# Patient Record
Sex: MALE | Race: OTHER | Employment: OTHER | URBAN - METROPOLITAN AREA
[De-identification: names, ages, dates, MRNs, and addresses within clinical notes are randomized per-mention and may not be internally consistent; named-entity substitution may affect disease eponyms.]

---

## 2023-05-23 ENCOUNTER — OFFICE VISIT (OUTPATIENT)
Dept: PRIMARY CARE | Facility: CLINIC | Age: 73
End: 2023-05-23
Payer: MEDICARE

## 2023-05-23 VITALS
WEIGHT: 207 LBS | HEIGHT: 72 IN | OXYGEN SATURATION: 98 % | DIASTOLIC BLOOD PRESSURE: 80 MMHG | SYSTOLIC BLOOD PRESSURE: 110 MMHG | BODY MASS INDEX: 28.04 KG/M2 | HEART RATE: 85 BPM

## 2023-05-23 DIAGNOSIS — E78.5 ELEVATED LIPIDS: ICD-10-CM

## 2023-05-23 DIAGNOSIS — Z00.00 MEDICARE ANNUAL WELLNESS VISIT, SUBSEQUENT: Primary | ICD-10-CM

## 2023-05-23 DIAGNOSIS — C61 PROSTATE CANCER (MULTI): ICD-10-CM

## 2023-05-23 DIAGNOSIS — R79.89 ABNORMAL TSH: ICD-10-CM

## 2023-05-23 DIAGNOSIS — I10 PRIMARY HYPERTENSION: ICD-10-CM

## 2023-05-23 DIAGNOSIS — Z00.00 ROUTINE GENERAL MEDICAL EXAMINATION AT HEALTH CARE FACILITY: ICD-10-CM

## 2023-05-23 DIAGNOSIS — R94.6 ABNORMAL THYROID FUNCTION TEST: ICD-10-CM

## 2023-05-23 DIAGNOSIS — E78.9 BORDERLINE HIGH CHOLESTEROL: ICD-10-CM

## 2023-05-23 DIAGNOSIS — E55.9 VITAMIN D DEFICIENCY: ICD-10-CM

## 2023-05-23 DIAGNOSIS — R73.03 PRE-DIABETES: ICD-10-CM

## 2023-05-23 PROBLEM — N20.0 NEPHROLITHIASIS: Status: ACTIVE | Noted: 2023-05-23

## 2023-05-23 PROBLEM — M17.0 OSTEOARTHRITIS OF KNEES, BILATERAL: Status: ACTIVE | Noted: 2023-05-23

## 2023-05-23 PROBLEM — Z96.659 S/P TKR (TOTAL KNEE REPLACEMENT): Status: ACTIVE | Noted: 2022-09-14

## 2023-05-23 PROBLEM — Z87.891 FORMER SMOKER, STOPPED SMOKING IN DISTANT PAST: Status: ACTIVE | Noted: 2022-09-14

## 2023-05-23 PROBLEM — Z86.010 HISTORY OF COLONIC POLYPS: Status: ACTIVE | Noted: 2021-11-13

## 2023-05-23 PROBLEM — R97.20 ELEVATED PSA: Status: ACTIVE | Noted: 2021-11-13

## 2023-05-23 PROBLEM — N40.0 BPH WITHOUT OBSTRUCTION/LOWER URINARY TRACT SYMPTOMS: Status: ACTIVE | Noted: 2023-05-23

## 2023-05-23 PROBLEM — F40.240 CLAUSTROPHOBIA: Status: ACTIVE | Noted: 2023-05-23

## 2023-05-23 PROBLEM — Z86.0100 HISTORY OF COLONIC POLYPS: Status: ACTIVE | Noted: 2021-11-13

## 2023-05-23 PROBLEM — M17.12 ARTHRITIS OF LEFT KNEE: Status: ACTIVE | Noted: 2023-05-23

## 2023-05-23 PROBLEM — M17.11 ARTHRITIS OF RIGHT KNEE: Status: ACTIVE | Noted: 2023-05-23

## 2023-05-23 PROBLEM — J30.2 SEASONAL ALLERGIES: Status: ACTIVE | Noted: 2021-11-13

## 2023-05-23 PROCEDURE — 1160F RVW MEDS BY RX/DR IN RCRD: CPT | Performed by: FAMILY MEDICINE

## 2023-05-23 PROCEDURE — G0439 PPPS, SUBSEQ VISIT: HCPCS | Performed by: FAMILY MEDICINE

## 2023-05-23 PROCEDURE — 1036F TOBACCO NON-USER: CPT | Performed by: FAMILY MEDICINE

## 2023-05-23 PROCEDURE — 1159F MED LIST DOCD IN RCRD: CPT | Performed by: FAMILY MEDICINE

## 2023-05-23 PROCEDURE — 1170F FXNL STATUS ASSESSED: CPT | Performed by: FAMILY MEDICINE

## 2023-05-23 PROCEDURE — 3079F DIAST BP 80-89 MM HG: CPT | Performed by: FAMILY MEDICINE

## 2023-05-23 PROCEDURE — 1126F AMNT PAIN NOTED NONE PRSNT: CPT | Performed by: FAMILY MEDICINE

## 2023-05-23 PROCEDURE — 3074F SYST BP LT 130 MM HG: CPT | Performed by: FAMILY MEDICINE

## 2023-05-23 RX ORDER — CELECOXIB 200 MG/1
200 CAPSULE ORAL
COMMUNITY
Start: 2015-09-22 | End: 2024-02-19

## 2023-05-23 RX ORDER — HYDRALAZINE HYDROCHLORIDE 50 MG/1
50 TABLET, FILM COATED ORAL 2 TIMES DAILY
COMMUNITY
Start: 2009-10-22 | End: 2023-11-20 | Stop reason: SDUPTHER

## 2023-05-23 RX ORDER — CLONIDINE 0.3 MG/24H
0.3 PATCH, EXTENDED RELEASE TRANSDERMAL
COMMUNITY
Start: 2009-12-22 | End: 2023-11-20 | Stop reason: SDUPTHER

## 2023-05-23 RX ORDER — TERAZOSIN 2 MG/1
1 CAPSULE ORAL NIGHTLY
COMMUNITY
Start: 2013-01-25 | End: 2023-11-20 | Stop reason: SDUPTHER

## 2023-05-23 RX ORDER — ASCORBIC ACID 125 MG
5000 TABLET,CHEWABLE ORAL EVERY MORNING
COMMUNITY

## 2023-05-23 RX ORDER — SAW PALMETTO 160 MG
160 CAPSULE ORAL DAILY
COMMUNITY
Start: 2009-12-22 | End: 2023-11-20 | Stop reason: SDUPTHER

## 2023-05-23 RX ORDER — TRIAMTERENE AND HYDROCHLOROTHIAZIDE 75; 50 MG/1; MG/1
1 TABLET ORAL
COMMUNITY
Start: 2012-12-14 | End: 2023-11-20 | Stop reason: SDUPTHER

## 2023-05-23 ASSESSMENT — PATIENT HEALTH QUESTIONNAIRE - PHQ9
7. TROUBLE CONCENTRATING ON THINGS, SUCH AS READING THE NEWSPAPER OR WATCHING TELEVISION: NOT AT ALL
5. POOR APPETITE OR OVEREATING: NOT AT ALL
10. IF YOU CHECKED OFF ANY PROBLEMS, HOW DIFFICULT HAVE THESE PROBLEMS MADE IT FOR YOU TO DO YOUR WORK, TAKE CARE OF THINGS AT HOME, OR GET ALONG WITH OTHER PEOPLE: NOT DIFFICULT AT ALL
SUM OF ALL RESPONSES TO PHQ9 QUESTIONS 1 AND 2: 0
3. TROUBLE FALLING OR STAYING ASLEEP OR SLEEPING TOO MUCH: NOT AT ALL
4. FEELING TIRED OR HAVING LITTLE ENERGY: NOT AT ALL
8. MOVING OR SPEAKING SO SLOWLY THAT OTHER PEOPLE COULD HAVE NOTICED. OR THE OPPOSITE, BEING SO FIGETY OR RESTLESS THAT YOU HAVE BEEN MOVING AROUND A LOT MORE THAN USUAL: NOT AT ALL
9. THOUGHTS THAT YOU WOULD BE BETTER OFF DEAD, OR OF HURTING YOURSELF: NOT AT ALL
6. FEELING BAD ABOUT YOURSELF - OR THAT YOU ARE A FAILURE OR HAVE LET YOURSELF OR YOUR FAMILY DOWN: NOT AT ALL
SUM OF ALL RESPONSES TO PHQ QUESTIONS 1-9: 0
2. FEELING DOWN, DEPRESSED OR HOPELESS: NOT AT ALL
1. LITTLE INTEREST OR PLEASURE IN DOING THINGS: NOT AT ALL

## 2023-05-23 ASSESSMENT — ENCOUNTER SYMPTOMS
OCCASIONAL FEELINGS OF UNSTEADINESS: 0
LOSS OF SENSATION IN FEET: 0
DEPRESSION: 0

## 2023-05-23 ASSESSMENT — ACTIVITIES OF DAILY LIVING (ADL)
TAKING_MEDICATION: INDEPENDENT
GROCERY_SHOPPING: INDEPENDENT
MANAGING_FINANCES: INDEPENDENT
BATHING: INDEPENDENT
DRESSING: INDEPENDENT
DOING_HOUSEWORK: INDEPENDENT

## 2023-05-23 NOTE — PROGRESS NOTES
Subjective   Reason for Visit: Anthony Henry is an 72 y.o. male here for a Medicare Wellness visit.     Past Medical, Surgical, and Family History reviewed and updated in chart.    Reviewed all medications by prescribing practitioner or clinical pharmacist (such as prescriptions, OTCs, herbal therapies and supplements) and documented in the medical record.    HPI    Patient Care Team:  Lindsay Bone DO as PCP - General (Family Medicine)     SOC Hx:  retired  36 years  Tobacco Use: Medium Risk (5/23/2023)    Patient History     Smoking Tobacco Use: Former     Smokeless Tobacco Use: Never     Passive Exposure: Not on file     Alcohol Use: Not on file       DIET: general    EXERCISE: Gym/health club routine includes tennis severa time per week - usually doubles.    ELIMINATION: no constipation or diarrhea    No urinary issuesurinary frequency  Followed by urology for stage 1 Prostate    SLEEP: no issues minimally disturbed    MOODS:   PHQ-9: Patient Health Questionnaire-9 Score: 0      POA & Living Will: Wife - Treva Cruz    Review of Systems    Objective   Vitals:  /80 (BP Location: Left arm, Patient Position: Standing, BP Cuff Size: Adult)   Pulse 85   Ht 1.829 m (6')   Wt 93.9 kg (207 lb)   SpO2 98%   BMI 28.07 kg/m²       Physical Exam  Constitutional:       General: He is not in acute distress.     Appearance: Normal appearance. He is not ill-appearing.   HENT:      Head: Normocephalic and atraumatic.   Neck:      Vascular: No carotid bruit.   Cardiovascular:      Rate and Rhythm: Normal rate and regular rhythm.      Pulses: Normal pulses.      Heart sounds: Normal heart sounds. No murmur heard.     No gallop.   Pulmonary:      Effort: Pulmonary effort is normal.      Breath sounds: Normal breath sounds. No wheezing, rhonchi or rales.   Musculoskeletal:      Cervical back: Normal range of motion and neck supple. No rigidity or tenderness.   Lymphadenopathy:      Cervical: No  cervical adenopathy.   Skin:     General: Skin is warm and dry.   Neurological:      Mental Status: He is alert.   Psychiatric:         Mood and Affect: Mood normal.         Behavior: Behavior normal.         Assessment/Plan   Problem List Items Addressed This Visit          Circulatory    Primary hypertension    Relevant Orders    CBC    Comprehensive Metabolic Panel       Endocrine/Metabolic    Pre-diabetes    Relevant Orders    Lipid Panel    Hemoglobin A1C       Other    Elevated lipids    Relevant Orders    Lipid Panel    Medicare annual wellness visit, subsequent - Primary    Borderline high cholesterol    Relevant Orders    Prostate Specific Antigen, Screen    Abnormal TSH    Relevant Orders    TSH with reflex to Free T4 if abnormal    Abnormal thyroid function test    Relevant Orders    TSH with reflex to Free T4 if abnormal     Other Visit Diagnoses       Routine general medical examination at health care facility        Vitamin D deficiency        Relevant Orders    Vitamin D, Total    Prostate cancer (CMS/HCC)        Relevant Orders    Prostate Specific Antigen, Screen        Follow up 6 months for CPE and fasting labs - call with any problems or concerns prior.

## 2023-08-31 LAB — PROSTATE SPECIFIC AG (NG/ML) IN SER/PLAS: 6.6 NG/ML (ref 0–4)

## 2023-11-20 ENCOUNTER — OFFICE VISIT (OUTPATIENT)
Dept: PRIMARY CARE | Facility: CLINIC | Age: 73
End: 2023-11-20
Payer: MEDICARE

## 2023-11-20 VITALS
WEIGHT: 209 LBS | OXYGEN SATURATION: 98 % | SYSTOLIC BLOOD PRESSURE: 124 MMHG | HEIGHT: 72 IN | HEART RATE: 85 BPM | BODY MASS INDEX: 28.31 KG/M2 | DIASTOLIC BLOOD PRESSURE: 80 MMHG

## 2023-11-20 DIAGNOSIS — R79.89 ABNORMAL TSH: ICD-10-CM

## 2023-11-20 DIAGNOSIS — N40.0 BPH WITHOUT OBSTRUCTION/LOWER URINARY TRACT SYMPTOMS: ICD-10-CM

## 2023-11-20 DIAGNOSIS — R97.20 ELEVATED PSA: ICD-10-CM

## 2023-11-20 DIAGNOSIS — I10 PRIMARY HYPERTENSION: ICD-10-CM

## 2023-11-20 DIAGNOSIS — E78.9 BORDERLINE HIGH CHOLESTEROL: ICD-10-CM

## 2023-11-20 DIAGNOSIS — Z00.00 ANNUAL PHYSICAL EXAM: Primary | ICD-10-CM

## 2023-11-20 PROCEDURE — 1036F TOBACCO NON-USER: CPT | Performed by: FAMILY MEDICINE

## 2023-11-20 PROCEDURE — 1126F AMNT PAIN NOTED NONE PRSNT: CPT | Performed by: FAMILY MEDICINE

## 2023-11-20 PROCEDURE — 99397 PER PM REEVAL EST PAT 65+ YR: CPT | Performed by: FAMILY MEDICINE

## 2023-11-20 PROCEDURE — 1159F MED LIST DOCD IN RCRD: CPT | Performed by: FAMILY MEDICINE

## 2023-11-20 PROCEDURE — 1160F RVW MEDS BY RX/DR IN RCRD: CPT | Performed by: FAMILY MEDICINE

## 2023-11-20 PROCEDURE — 3074F SYST BP LT 130 MM HG: CPT | Performed by: FAMILY MEDICINE

## 2023-11-20 PROCEDURE — 3079F DIAST BP 80-89 MM HG: CPT | Performed by: FAMILY MEDICINE

## 2023-11-20 RX ORDER — TRIAMTERENE AND HYDROCHLOROTHIAZIDE 75; 50 MG/1; MG/1
1 TABLET ORAL
Qty: 90 TABLET | Refills: 1 | Status: SHIPPED | OUTPATIENT
Start: 2023-11-20 | End: 2024-04-30 | Stop reason: SDUPTHER

## 2023-11-20 RX ORDER — TERAZOSIN 2 MG/1
2 CAPSULE ORAL NIGHTLY
Qty: 90 CAPSULE | Refills: 1 | Status: SHIPPED | OUTPATIENT
Start: 2023-11-20 | End: 2024-05-21 | Stop reason: SDUPTHER

## 2023-11-20 RX ORDER — SAW PALMETTO 160 MG
160 CAPSULE ORAL DAILY
Qty: 90 CAPSULE | Refills: 1 | Status: SHIPPED | OUTPATIENT
Start: 2023-11-20 | End: 2024-05-21 | Stop reason: ALTCHOICE

## 2023-11-20 RX ORDER — HYDRALAZINE HYDROCHLORIDE 50 MG/1
50 TABLET, FILM COATED ORAL 2 TIMES DAILY
Qty: 180 TABLET | Refills: 1 | Status: SHIPPED | OUTPATIENT
Start: 2023-11-20 | End: 2024-05-21 | Stop reason: SDUPTHER

## 2023-11-20 RX ORDER — CLONIDINE 0.3 MG/24H
1 PATCH, EXTENDED RELEASE TRANSDERMAL
Qty: 12 PATCH | Refills: 1 | Status: ON HOLD | OUTPATIENT
Start: 2023-11-20 | End: 2024-02-26 | Stop reason: SDUPTHER

## 2023-11-20 NOTE — ASSESSMENT & PLAN NOTE
BP controlled today.  Refilling medications at current dosages.  Follow up at least every 6 months.

## 2023-11-20 NOTE — PROGRESS NOTES
"Randy Henry \"Romario" is a 73 y.o. male and is here for a comprehensive physical exam. The patient reports  the following concerns .    LEFT ANKLE SWELLING: last month or so seems to be a little more common  S/p bilateral knee replacements, but nothing recent  No SOB or chest pain associated with this edema    Do you take any herbs or supplements that were not prescribed by a doctor? not asked  Are you taking calcium supplements? no  Are you taking aspirin daily? no    SOC Hx:   Tobacco Use: Medium Risk (11/20/2023)    Patient History     Smoking Tobacco Use: Former     Smokeless Tobacco Use: Never     Passive Exposure: Not on file     Alcohol Use: Not on file       DIET: general    EXERCISE: Exercise is limited by orthopedic condition(s): knee osteoarthritis.    ELIMINATION: no constipation or diarrhea    No urinary issues followed by  Dr. Alba  PSA   Date Value Ref Range Status   08/31/2023 6.60 (H) 0.00 - 4.00 ng/mL Final     Comment:     The FDA requires that the method used for PSA assay be   reported to the physician. Values obtained with different   assay methods must not be used interchangeably. This test   was performed at [facility] using the Siemens  SNAPP'llica PSA method, which is a sandwich immunoassay using   chemiluminescence for quantitation. The assay is approved  for measurement of prostate-specific antigen (PSA) in   serum and may be used in conjunction with a digital rectal  examination in men 50 years and older as an aid in   detection of prostate cancer.   5-Alpha-reductase inhibitors (e.g. Proscar, Finasteride,   Avodart, Dutasteride and Velma) for the treatment of BPH   have been shown to lower PSA levels by an average of 50%   after 6 months of treatment.     09/14/2020 4.4 (H) 0.0 - 4.0 ng/mL Final     Comment:     INTERPRETIVE INFORMATION: Prostate Specific Antigen  The Roche PSA electrochemiluminescent immunoassay was used.   Results obtained with " different test methods or kits cannot be   used interchangeably. The Roche PSA method is approved for use as   an aid in the detection of prostate cancer when used in   conjunction with a digital rectal exam in men age 50 and older.   The Roche PSA method is also indicated for the serial measurement   of PSA to aid in the prognosis and management of prostate cancer   patients. Elevated PSA concentrations can only suggest the   presence of prostate cancer until biopsy is performed. PSA   concentrations can also be elevated in benign prostatic   hyperplasia or inflammatory conditions of the prostate. PSA is   generally not elevated in healthy men or men with non-prostatic   carcinoma.       PSA, Free   Date Value Ref Range Status   09/14/2020 0.4 ng/mL Final     PSA, Free Pct   Date Value Ref Range Status   09/14/2020 9 % Final     Comment:     INTERPRETIVE INFORMATION: Prostate Specific Antigen, Free   Percentage  [facility] uses the Roche Free PSA electrochemiluminescent immunoassay   method in conjunction with the Roche PSA electrochemiluminescent   immunoassay method to determine the free PSA percentage. Values   obtained with different assay methods should not be used   interchangeably. The free PSA percentage is an aid in   distinguishing prostate cancer from benign prostatic conditions in   men age 50 and older with a total PSA between 3 and 10 ng/mL and   negative digital rectal examination findings. Prostatic biopsy is   required for the diagnosis of cancer.  In patients with total PSA concentrations of 4-10 ng/mL, the   probability of finding prostate cancer on needle biopsy by age in   years is:  %fPSA               50-59    60-69    70 or older  0  - 10%            49%      58%      65%  11 - 18%            27%      34%      41%  19 - 25%            18%      24%      30%  Greater than 25%     9%      12%      16%  Other factors may help determine the actual risk of prostate   cancer in individual  patients.  Performed By: [employer]  [address] [place] [address]  : Abigail Pichardo MD         SLEEP: no issues minimally disturbed  Waking frequently to void   Tried Melatonin which seemed to help   Tried Z-quel makes  him drowsy the next     MOODS: stress manageable, no concerning symptoms             Health Maintenance Due   Topic Date Due    Hepatitis C Screening  Never done    Abdominal Aortic Aneurysm (AAA) screening  Never done        Objective   /80 (BP Location: Left arm, Patient Position: Sitting, BP Cuff Size: Adult)   Pulse 85   Ht 1.829 m (6')   Wt 94.8 kg (209 lb)   SpO2 98%   BMI 28.35 kg/m²    Physical Exam  Constitutional:       General: He is not in acute distress.     Appearance: Normal appearance. He is not ill-appearing.   HENT:      Head: Normocephalic and atraumatic.   Neck:      Vascular: No carotid bruit.   Cardiovascular:      Rate and Rhythm: Normal rate and regular rhythm.      Pulses: Normal pulses.      Heart sounds: Normal heart sounds. No murmur heard.     No gallop.   Pulmonary:      Effort: Pulmonary effort is normal.      Breath sounds: Normal breath sounds. No wheezing, rhonchi or rales.   Musculoskeletal:      Cervical back: Normal range of motion and neck supple. No rigidity or tenderness.   Lymphadenopathy:      Cervical: No cervical adenopathy.   Skin:     General: Skin is warm and dry.   Neurological:      Mental Status: He is alert.   Psychiatric:         Mood and Affect: Mood normal.         Behavior: Behavior normal.         Assessment/Plan   Healthy male exam.      1. Orders for fasting labs have been placed in your chart.    Please  have labs drawn at their convenience after 12 hour fast - ok to have water, black coffee or black tea prior to lab draw - water encouraged to ensure adequate hydration.    Please note: EPIC releases your results to your My Chart immediately but this does not mean it has been reviewed by your  provider.  Someone from our office will contact you once results have been reviewed.     2. Discussed the patient's BMI with him.  The BMI is above average. The patient received encouragement for well rounded diet because they have an above normal BMI.    Problem List Items Addressed This Visit       BPH without obstruction/lower urinary tract symptoms    Relevant Medications    saw palmetto 160 mg capsule    terazosin (Hytrin) 2 mg capsule    Elevated PSA     Follow up with urology as scheduled.         Primary hypertension     BP controlled today.  Refilling medications at current dosages.  Follow up at least every 6 months.         Relevant Medications    cloNIDine (Catapres-TTS) 0.3 mg/24 hr patch    hydrALAZINE (Apresoline) 50 mg tablet    triamterene-hydrochlorothiazid (Maxzide) 75-50 mg tablet    Borderline high cholesterol     Review of last fasting labs from previous PCP with good cholesterol levels.         Abnormal TSH     Review of last TSH 5/2023 within normal ranges.  Repeat in 6 months.          Other Visit Diagnoses       Annual physical exam    -  Primary             Follow up in 6 months

## 2023-12-07 ENCOUNTER — LAB (OUTPATIENT)
Dept: LAB | Facility: LAB | Age: 73
End: 2023-12-07
Payer: MEDICARE

## 2023-12-07 DIAGNOSIS — C61 MALIGNANT NEOPLASM OF PROSTATE (MULTI): ICD-10-CM

## 2023-12-07 DIAGNOSIS — C61 MALIGNANT NEOPLASM OF PROSTATE (MULTI): Primary | ICD-10-CM

## 2023-12-07 DIAGNOSIS — I10 PRIMARY HYPERTENSION: ICD-10-CM

## 2023-12-07 DIAGNOSIS — R73.03 PRE-DIABETES: ICD-10-CM

## 2023-12-07 LAB
ALBUMIN SERPL BCP-MCNC: 4.1 G/DL (ref 3.4–5)
ALP SERPL-CCNC: 63 U/L (ref 33–136)
ALT SERPL W P-5'-P-CCNC: 19 U/L (ref 10–52)
ANION GAP SERPL CALC-SCNC: 14 MMOL/L (ref 10–20)
AST SERPL W P-5'-P-CCNC: 20 U/L (ref 9–39)
BILIRUB SERPL-MCNC: 0.7 MG/DL (ref 0–1.2)
BUN SERPL-MCNC: 23 MG/DL (ref 6–23)
CALCIUM SERPL-MCNC: 9 MG/DL (ref 8.6–10.6)
CHLORIDE SERPL-SCNC: 107 MMOL/L (ref 98–107)
CO2 SERPL-SCNC: 26 MMOL/L (ref 21–32)
CREAT SERPL-MCNC: 1.11 MG/DL (ref 0.5–1.3)
ERYTHROCYTE [DISTWIDTH] IN BLOOD BY AUTOMATED COUNT: 12.6 % (ref 11.5–14.5)
EST. AVERAGE GLUCOSE BLD GHB EST-MCNC: 120 MG/DL
GFR SERPL CREATININE-BSD FRML MDRD: 70 ML/MIN/1.73M*2
GLUCOSE SERPL-MCNC: 98 MG/DL (ref 74–99)
HBA1C MFR BLD: 5.8 %
HCT VFR BLD AUTO: 44.7 % (ref 41–52)
HGB BLD-MCNC: 15.3 G/DL (ref 13.5–17.5)
MCH RBC QN AUTO: 32.6 PG (ref 26–34)
MCHC RBC AUTO-ENTMCNC: 34.2 G/DL (ref 32–36)
MCV RBC AUTO: 95 FL (ref 80–100)
NRBC BLD-RTO: 0 /100 WBCS (ref 0–0)
PLATELET # BLD AUTO: 179 X10*3/UL (ref 150–450)
POTASSIUM SERPL-SCNC: 3.7 MMOL/L (ref 3.5–5.3)
PROT SERPL-MCNC: 6.3 G/DL (ref 6.4–8.2)
PSA SERPL-MCNC: 6.14 NG/ML
RBC # BLD AUTO: 4.7 X10*6/UL (ref 4.5–5.9)
SODIUM SERPL-SCNC: 143 MMOL/L (ref 136–145)
WBC # BLD AUTO: 6.6 X10*3/UL (ref 4.4–11.3)

## 2023-12-07 PROCEDURE — 83036 HEMOGLOBIN GLYCOSYLATED A1C: CPT

## 2023-12-07 PROCEDURE — 36415 COLL VENOUS BLD VENIPUNCTURE: CPT

## 2023-12-07 PROCEDURE — 85027 COMPLETE CBC AUTOMATED: CPT

## 2023-12-07 PROCEDURE — 80053 COMPREHEN METABOLIC PANEL: CPT

## 2023-12-07 PROCEDURE — 84153 ASSAY OF PSA TOTAL: CPT

## 2023-12-14 ENCOUNTER — HOSPITAL ENCOUNTER (OUTPATIENT)
Dept: RADIOLOGY | Facility: HOSPITAL | Age: 73
Discharge: HOME | End: 2023-12-14
Payer: MEDICARE

## 2023-12-14 DIAGNOSIS — C61 MALIGNANT NEOPLASM OF PROSTATE (MULTI): ICD-10-CM

## 2023-12-14 PROCEDURE — A9575 INJ GADOTERATE MEGLUMI 0.1ML: HCPCS | Performed by: STUDENT IN AN ORGANIZED HEALTH CARE EDUCATION/TRAINING PROGRAM

## 2023-12-14 PROCEDURE — 2550000001 HC RX 255 CONTRASTS: Performed by: STUDENT IN AN ORGANIZED HEALTH CARE EDUCATION/TRAINING PROGRAM

## 2023-12-14 PROCEDURE — 76498 UNLISTED MR PROCEDURE: CPT | Performed by: STUDENT IN AN ORGANIZED HEALTH CARE EDUCATION/TRAINING PROGRAM

## 2023-12-14 PROCEDURE — 72197 MRI PELVIS W/O & W/DYE: CPT

## 2023-12-14 PROCEDURE — 72197 MRI PELVIS W/O & W/DYE: CPT | Performed by: STUDENT IN AN ORGANIZED HEALTH CARE EDUCATION/TRAINING PROGRAM

## 2023-12-14 RX ORDER — GADOTERATE MEGLUMINE 376.9 MG/ML
18 INJECTION INTRAVENOUS
Status: COMPLETED | OUTPATIENT
Start: 2023-12-14 | End: 2023-12-14

## 2023-12-14 RX ADMIN — GADOTERATE MEGLUMINE 18 ML: 376.9 INJECTION INTRAVENOUS at 12:16

## 2023-12-21 ENCOUNTER — OFFICE VISIT (OUTPATIENT)
Dept: UROLOGY | Facility: CLINIC | Age: 73
End: 2023-12-21
Payer: MEDICARE

## 2023-12-21 VITALS — WEIGHT: 216 LBS | HEIGHT: 72 IN | BODY MASS INDEX: 29.26 KG/M2 | TEMPERATURE: 98.4 F

## 2023-12-21 DIAGNOSIS — C61 PROSTATE CANCER (MULTI): Primary | ICD-10-CM

## 2023-12-21 PROCEDURE — 99214 OFFICE O/P EST MOD 30 MIN: CPT | Performed by: STUDENT IN AN ORGANIZED HEALTH CARE EDUCATION/TRAINING PROGRAM

## 2023-12-21 PROCEDURE — 1160F RVW MEDS BY RX/DR IN RCRD: CPT | Performed by: STUDENT IN AN ORGANIZED HEALTH CARE EDUCATION/TRAINING PROGRAM

## 2023-12-21 PROCEDURE — 1126F AMNT PAIN NOTED NONE PRSNT: CPT | Performed by: STUDENT IN AN ORGANIZED HEALTH CARE EDUCATION/TRAINING PROGRAM

## 2023-12-21 PROCEDURE — 1159F MED LIST DOCD IN RCRD: CPT | Performed by: STUDENT IN AN ORGANIZED HEALTH CARE EDUCATION/TRAINING PROGRAM

## 2023-12-21 PROCEDURE — 1036F TOBACCO NON-USER: CPT | Performed by: STUDENT IN AN ORGANIZED HEALTH CARE EDUCATION/TRAINING PROGRAM

## 2023-12-21 RX ORDER — BISMUTH SUBSALICYLATE 262 MG
1 TABLET,CHEWABLE ORAL DAILY
COMMUNITY

## 2023-12-21 NOTE — LETTER
December 25, 2023     Elissa Iqbal MD PhD  [id number]  Department Of Radiation Oncology  [facility] [address]    Patient: Jack Henry   YOB: 1950   Date of Visit: 12/21/2023       Dear Dr. Elissa Iqbal MD PhD:    Thank you for referring Jack Henry to me for evaluation. Below are my notes for this consultation.  If you have questions, please do not hesitate to call me. I look forward to following your patient along with you.       Sincerely,     Shmuel Alba MD      CC: Lindsay Bone, DO  ______________________________________________________________________________________    HPI:  Proc (2/14/23): TP prostate biopsy   Path: prostatic adenocarcinoma, LOLY #1 GG2 ([place] 3+4=7), 4/5 cores (25% of tissue), pattern 4, GG1 ([place] 3+3=6), 2/2 cores (35% of tissue)     73 year old male referred by Dr. Rico for elevated PSA. Hx of BPH. PSA 6.60 (8/31/23). MRI prostate (12/29/21) showed diffuse non nodular hypointensities within the PZ, without evidence of focally restricted diffusion (PI-RADS 2). No evidence of pelvic lymphadenopathy. S/p TP prostate biopsy (2/14/23) with pathology showing prostatic adenocarcinoma, LOLY #1 GG2 ([place] 3+4=7), 4/5 cores (25% of tissue), pattern 4. Patient saw Dr. Iqbal, radiation oncology (3/13/23), and decided against radiation treatment at this time. MRI Prostate (12/14/23) showed Interval increase in size of a now PI-RADS 5 in the mid to apical left anterolateral TZ, there is broad abutment of the anterior capsule without evidence of extracapsular extension or enlarged pelvic lymph nodes. Doing well.      Current smoker  SHx: bilateral knee replacements 22'  No family hx of prostate cancer     PSA: 6.14 (12/7/23), 6.60 (8/31/23), 6.78, fPSA 8% (10/17/22)  4K score (10/17/22): 63.5     MRI prostate (12/29/21): diffuse non nodular hypointensities within the PZ, without evidence of focally restricted diffusion (PI-RADS 2). No evidence of pelvic  lymphadenopathy.     MRI Prostate (12/14/23): Interval increase in size of a now PI-RADS 5 in the mid to apical left anterolateral TZ, there is broad abutment of the anterior capsule without evidence of extracapsular extension or enlarged pelvic lymph nodes.    Review of Systems:  All systems reviewed. Anything negative noted in the HPI.    Physical Exam:  Vitals signs reviewed.  Constitutional:      Appearance: Well-developed.  HENT:     Head: Normocephalic and atraumatic.  Neck:     Musculoskeletal: Normal range of motion.  Pulmonary:     Effort: Pulmonary effort is normal.  Musculoskeletal: Normal range of motion.  Skin:     General: Skin is warm and dry.  Neurological:     Mental Status: Alert and oriented to person, place, and time.  Psychiatric:        Behavior: Behavior normal.        Thought Content: Thought content normal.        Judgment: Judgment normal.    Assessment/Plan  73 year old male referred by Dr. Rico for elevated PSA. Hx of BPH. PSA 6.60 (8/31/23). MRI prostate (12/29/21) showed diffuse non nodular hypointensities within the PZ, without evidence of focally restricted diffusion (PI-RADS 2). No evidence of pelvic lymphadenopathy. S/p TP prostate biopsy (2/14/23) with pathology showing prostatic adenocarcinoma, LOLY #1 GG2 ([place] 3+4=7), 4/5 cores (25% of tissue), pattern 4. Patient saw Dr. Iqbal, radiation oncology (3/13/23), and decided against radiation treatment at this time. MRI Prostate (12/14/23) showed Interval increase in size of a now PI-RADS 5 in the mid to apical left anterolateral TZ, there is broad abutment of the anterior capsule without evidence of extracapsular extension or enlarged pelvic lymph nodes. Doing well. Management options including risks, benefits and alternatives discussed at length and all questions answered. Patient prefers to proceed with refer to radiation oncology. Will plan to proceed with [facility] and TP prostate biopsy at [facility] [place] [facility].             By signing my name  below, I, Lou Stevenson, attest that this documentation has been prepared under the direction and in the presence of Dr. Shmuel Alba.  All medical record entries made by the Lou were at my direction and personally dictated by me. I have reviewed the chart and agree that the record accurately reflects my personal performance of the history, physical exam, discussion and plan.

## 2023-12-21 NOTE — PROGRESS NOTES
HPI:  Proc (2/14/23): TP prostate biopsy   Path: prostatic adenocarcinoma, LOLY #1 GG2 ([place] 3+4=7), 4/5 cores (25% of tissue), pattern 4, GG1 ([place] 3+3=6), 2/2 cores (35% of tissue)     73 year old male referred by Dr. Rico for elevated PSA. Hx of BPH. PSA 6.60 (8/31/23). MRI prostate (12/29/21) showed diffuse non nodular hypointensities within the PZ, without evidence of focally restricted diffusion (PI-RADS 2). No evidence of pelvic lymphadenopathy. S/p TP prostate biopsy (2/14/23) with pathology showing prostatic adenocarcinoma, LOLY #1 GG2 ([place] 3+4=7), 4/5 cores (25% of tissue), pattern 4. Patient saw Dr. Iqbal, radiation oncology (3/13/23), and decided against radiation treatment at this time. MRI Prostate (12/14/23) showed Interval increase in size of a now PI-RADS 5 in the mid to apical left anterolateral TZ, there is broad abutment of the anterior capsule without evidence of extracapsular extension or enlarged pelvic lymph nodes. Doing well.      Current smoker  SHx: bilateral knee replacements 22'  No family hx of prostate cancer     PSA: 6.14 (12/7/23), 6.60 (8/31/23), 6.78, fPSA 8% (10/17/22)  4K score (10/17/22): 63.5     MRI prostate (12/29/21): diffuse non nodular hypointensities within the PZ, without evidence of focally restricted diffusion (PI-RADS 2). No evidence of pelvic lymphadenopathy.     MRI Prostate (12/14/23): Interval increase in size of a now PI-RADS 5 in the mid to apical left anterolateral TZ, there is broad abutment of the anterior capsule without evidence of extracapsular extension or enlarged pelvic lymph nodes.    Review of Systems:  All systems reviewed. Anything negative noted in the HPI.    Physical Exam:  Vitals signs reviewed.  Constitutional:      Appearance: Well-developed.  HENT:     Head: Normocephalic and atraumatic.  Neck:     Musculoskeletal: Normal range of motion.  Pulmonary:     Effort: Pulmonary effort is normal.  Musculoskeletal: Normal range of  motion.  Skin:     General: Skin is warm and dry.  Neurological:     Mental Status: Alert and oriented to person, place, and time.  Psychiatric:        Behavior: Behavior normal.        Thought Content: Thought content normal.        Judgment: Judgment normal.    Assessment/Plan   73 year old male referred by Dr. Rico for elevated PSA. Hx of BPH. PSA 6.60 (8/31/23). MRI prostate (12/29/21) showed diffuse non nodular hypointensities within the PZ, without evidence of focally restricted diffusion (PI-RADS 2). No evidence of pelvic lymphadenopathy. S/p TP prostate biopsy (2/14/23) with pathology showing prostatic adenocarcinoma, LOLY #1 GG2 ([place] 3+4=7), 4/5 cores (25% of tissue), pattern 4. Patient saw Dr. Iqbal, radiation oncology (3/13/23), and decided against radiation treatment at this time. MRI Prostate (12/14/23) showed Interval increase in size of a now PI-RADS 5 in the mid to apical left anterolateral TZ, there is broad abutment of the anterior capsule without evidence of extracapsular extension or enlarged pelvic lymph nodes. Doing well. Management options including risks, benefits and alternatives discussed at length and all questions answered. Patient prefers to proceed with refer to radiation oncology. Will plan to proceed with [facility] and TP prostate biopsy at [facility] [place].             By signing my name below, I, Lou Stevenson, attest that this documentation has been prepared under the direction and in the presence of Dr. Shmuel Alba.  All medical record entries made by the Scribe were at my direction and personally dictated by me. I have reviewed the chart and agree that the record accurately reflects my personal performance of the history, physical exam, discussion and plan.

## 2024-01-08 ENCOUNTER — HOSPITAL ENCOUNTER (OUTPATIENT)
Dept: RADIATION ONCOLOGY | Facility: CLINIC | Age: 74
Setting detail: RADIATION/ONCOLOGY SERIES
Discharge: HOME | End: 2024-01-08
Payer: MEDICARE

## 2024-01-08 ENCOUNTER — APPOINTMENT (OUTPATIENT)
Dept: RADIATION ONCOLOGY | Facility: HOSPITAL | Age: 74
End: 2024-01-08
Payer: MEDICARE

## 2024-01-08 ENCOUNTER — PREP FOR PROCEDURE (OUTPATIENT)
Dept: UROLOGY | Facility: HOSPITAL | Age: 74
End: 2024-01-08

## 2024-01-08 VITALS
RESPIRATION RATE: 16 BRPM | OXYGEN SATURATION: 96 % | HEIGHT: 72 IN | DIASTOLIC BLOOD PRESSURE: 78 MMHG | SYSTOLIC BLOOD PRESSURE: 146 MMHG | BODY MASS INDEX: 29.14 KG/M2 | TEMPERATURE: 98.1 F | WEIGHT: 215.17 LBS | HEART RATE: 74 BPM

## 2024-01-08 DIAGNOSIS — C61 PROSTATE CANCER (MULTI): Primary | ICD-10-CM

## 2024-01-08 DIAGNOSIS — C61 PROSTATE CANCER (MULTI): ICD-10-CM

## 2024-01-08 PROCEDURE — 99213 OFFICE O/P EST LOW 20 MIN: CPT | Performed by: STUDENT IN AN ORGANIZED HEALTH CARE EDUCATION/TRAINING PROGRAM

## 2024-01-08 PROCEDURE — 77263 THER RADIOLOGY TX PLNG CPLX: CPT | Performed by: STUDENT IN AN ORGANIZED HEALTH CARE EDUCATION/TRAINING PROGRAM

## 2024-01-08 RX ORDER — SODIUM CHLORIDE, SODIUM LACTATE, POTASSIUM CHLORIDE, CALCIUM CHLORIDE 600; 310; 30; 20 MG/100ML; MG/100ML; MG/100ML; MG/100ML
100 INJECTION, SOLUTION INTRAVENOUS CONTINUOUS
Status: CANCELLED | OUTPATIENT
Start: 2024-01-08

## 2024-01-08 ASSESSMENT — LIFESTYLE VARIABLES
HOW OFTEN DO YOU HAVE SIX OR MORE DRINKS ON ONE OCCASION: NEVER
SKIP TO QUESTIONS 9-10: 1
HOW OFTEN DO YOU HAVE A DRINK CONTAINING ALCOHOL: MONTHLY OR LESS
HOW OFTEN DO YOU HAVE SIX OR MORE DRINKS ON ONE OCCASION: NEVER
AUDIT-C TOTAL SCORE: 1
AUDIT-C TOTAL SCORE: 0
HOW MANY STANDARD DRINKS CONTAINING ALCOHOL DO YOU HAVE ON A TYPICAL DAY: 1 OR 2
HOW OFTEN DO YOU HAVE A DRINK CONTAINING ALCOHOL: NEVER
SKIP TO QUESTIONS 9-10: 1
HOW MANY STANDARD DRINKS CONTAINING ALCOHOL DO YOU HAVE ON A TYPICAL DAY: 1 OR 2

## 2024-01-08 ASSESSMENT — PATIENT HEALTH QUESTIONNAIRE - PHQ9
SUM OF ALL RESPONSES TO PHQ9 QUESTIONS 1 AND 2: 0
1. LITTLE INTEREST OR PLEASURE IN DOING THINGS: NOT AT ALL
2. FEELING DOWN, DEPRESSED OR HOPELESS: NOT AT ALL

## 2024-01-08 ASSESSMENT — COLUMBIA-SUICIDE SEVERITY RATING SCALE - C-SSRS
1. IN THE PAST MONTH, HAVE YOU WISHED YOU WERE DEAD OR WISHED YOU COULD GO TO SLEEP AND NOT WAKE UP?: NO
2. HAVE YOU ACTUALLY HAD ANY THOUGHTS OF KILLING YOURSELF?: NO
6. HAVE YOU EVER DONE ANYTHING, STARTED TO DO ANYTHING, OR PREPARED TO DO ANYTHING TO END YOUR LIFE?: NO

## 2024-01-08 ASSESSMENT — PAIN SCALES - GENERAL: PAINLEVEL: 0-NO PAIN

## 2024-01-08 NOTE — H&P
History Of Present Illness  Jack Henry is a 73 y.o. male presenting with .     Past Medical History  He has a past medical history of Hypertension, Personal history of other diseases of the circulatory system, Personal history of other endocrine, nutritional and metabolic disease, and Prostate cancer (CMS/HCC) (03/2023).    Surgical History  He has a past surgical history that includes Knee arthroscopy w/ debridement (12/17/2013) and Total knee arthroplasty (Bilateral, 2022).     Social History  He reports that he quit smoking about 11 years ago. His smoking use included cigarettes and cigars. He has a 1.25 pack-year smoking history. He has never used smokeless tobacco. He reports current alcohol use. He reports that he does not use drugs.    Family History  Family History   Problem Relation Name Age of Onset    Bilateral breast cancer Mother Kristal     Colon cancer Mother Kristal     Other (htn) Mother Kristal     Cancer Mother Kristal     Parkinsonism Father          Allergies  Patient has no known allergies.    Review of Systems     Physical Exam     Last Recorded Vitals  There were no vitals taken for this visit.    Relevant Results    No results found for this or any previous visit (from the past 24 hour(s)).    MR prostate loly boundaries    Result Date: 12/19/2023  Interpreted By:  Kayden June and Ebai Jerky STUDY: MR PROSTATE LOLY BOUNDARIES;  12/14/2023 12:27 pm   INDICATION: Signs/Symptoms: cancer  C61: Prostate cancer.  Per EMR: Patient with a history of prostate cancer, positive [place] 7 biopsy on 02/14/2023.   COMPARISON: Prostate MRI 01/26/2023.   ACCESSION NUMBER(S): [id number]   ORDERING CLINICIAN: SADE LAM   TECHNIQUE: Multiplanar MRI of the pelvis was obtained including axial, sagittal and coronal T2 weighted SSFSE, axial and sagittal T2 FSE, axial DWI, pre and post gadolinium dynamic T1 GRE sequences. Multiparametric analysis was performed.   18 mL of Dotarem was  administered intravenously without immediate complications.   3D post-processing was performed using [employer], on an independent workstation, for the purpose of enabling fusion with ultrasound, and provided it for review.   FINDINGS: PROSTATE VOLUME: The prostate measures  5.7 cm x  4.1 cm  x  4.6 cm in right-to-left, anterior-posterior and craniocaudal dimension.   Prostate weight is estimated at 56.3g. PSA density is 0.11 ng/mL/g.   PROSTATE PARENCHYMA: There is heterogeneous enlargement of the transition zone, consistent with benign prostatic hyperplasia. The peripheral zone is diffusely heterogenous on T2WI, with bilateral polygonal and wedge-shaped T2-hypointense areas, that show no signs of abnormally restricted diffusion (PI-RADS 2). Interval increase in size of a now 2.8 x 1.3 cm, previously 1.2 x 0.6 cm T2 hypointense focal lesion is noted in the mid to apical left anterolateral transitional zone, showing focally restricted diffusion, consistent with a PI-RADS 5 lesion. Lesion broadly abuts the anterior capsule bulging or irregularity to suggest extracapsular extension.   EXTRACAPSULAR EXTENSION: None.   SEMINAL VESICLES: Within normal limits.   PELVIC LYMPH NODES: No abnormally enlarged pelvic lymph nodes are identified.   PERITONEUM: No free or loculated fluid collections are evident in the pelvis.   OTHER ORGANS: Sigmoid diverticulosis without evidence of diverticulitis.   BONES: No focal lesions are noted in the bone.       1. Interval increase in size of a now PI-RADS 5 in the mid to apical left anterolateral transitional zone. There is broad abutment of the anterior capsule without evidence of extracapsular extension. 2. No evidence of enlarged pelvic lymph nodes.   PI-RADS 5 - Very high (clinically significant cancer is highly likely to be present).   3D post-processing was performed using [employer] on an independent workstation, for the purpose of enabling fusion with ultrasound, and provided for  review.   I personally reviewed the images/study and I agree with the findings as stated. This study was interpreted at [facility], [place], [place].   MACRO: None   Signed by: Kayden June 12/19/2023 7:05 PM Dictation workstation:   [id number]           Assessment/Plan              I spent  minutes in the professional and overall care of this patient.      Shmeul Alba MD

## 2024-01-08 NOTE — PROGRESS NOTES
"Staff Physician: Elissa Iqbal MD PhD  Date of Service: 2024    RADIATION ONCOLOGY FOLLOW UP NOTE  IDENTIFYING DATA:   Cancer Staging   Prostate cancer (CMS/[facility])  Staging form: Prostate, AJCC 8th Edition  - Clinical stage from 2023: cT1c, cN0, PSA: 6.6, Grade Group: 2 - Signed by Elissa Iqbal MD PhD on 2024    Problem List Items Addressed This Visit       Prostate cancer (CMS/[facility])    Relevant Orders    Referral to Radiation Oncology    Rad Onc Intent to Treat     He was last seen in Radiation Oncology on 3/13/23 and returns today for to discuss radiation treatment.    Interval History:  2023 6.6    2023 PSA 6.14    2023 MRI prostate, compared to 2023, noted enlargement of the PI-RADS 5 lesion in the left anterior lateral TZ with abutment of the anterior capsule without PILI.  No SVI or abnormal lymphadenopathy.    Today, he is here by himself.     IPSS (International Prostate Symptom Score):   14  35, bother 3   - He is not experiencing urinary incontinence. On terazosin.   - He is not experiencing dysuria, hematuria, flank pain.   Sexual function (BAYRON) Score:      - Use of erectile dysfunction medications:  None  Bowel function:  normal   - Denies hematochezia or pain.    - He does have a history of hemorrhoids, however, has not had any symptoms.   Unintentional weight loss: none  Pain score: 0/10     A 10 point review of systems was reviewed with pertinent positives and negatives noted in HPI. All other systems have been reviewed and are negative.    PERFORMANCE STATUS:  Karnofsky Performance Score/ECO, Fully active, able to carry on all pre-disease performed without restriction (ECOG equivalent 0)    PHYSICAL EXAMINATION:  /78 (BP Location: Right arm, Patient Position: Sitting, BP Cuff Size: Adult)   Pulse 74   Temp 36.7 °C (98.1 °F) (Temporal)   Resp 16   Ht 1.829 m (6' 0.01\")   Wt 97.6 kg (215 lb 2.7 oz)   SpO2 96%   BMI 29.18 kg/m² "   Constitutional: well developed, no distress, alert & oriented, cooperative  Eyes: pupils equal round and reactive to light, extraocular movements intact  Respiratory: normal work of breathing    DIAGNOSTIC REPORTS REVIEWED:  Imaging: All imaging was personally reviewed and interpreted in clinic. Findings as per interval history and EMR.  Laboratory/Pathology:  All pertinent labs and pathology were personally reviewed and interpreted in clinic. Findings as per interval history and EMR.  Lab Results   Component Value Date    PSA 6.14 (H) 12/07/2023    PSA 6.60 (H) 08/31/2023    PSA 5.12 (H) 04/18/2022     IMPRESSION:  73 year-old gentleman with a history of favorable intermediate risk prostate adenocarcinoma (iPSA 6.78, GG2 in < 50% cores involved, 1/1+ targeted core), managed on active surveillance, with recent increase in PI-RADS 5 abnormality on MRI. He would like to proceed with radiation therapy.     I discussed various EBRT treatment regimens, including moderate hypofractionation over 20 treatments, and ultra hypofractionation over 5 treatments. His IPSS is 14 on terazosin. Discussed the logistics of radiation, including placement of fiducials into the prostate for improved and SpaceOAR to reduce radiation dose to the rectum.  Logistics of radiation therapy including CT-based simulation and bowel and bladder preparation were discussed, as well as risks and benefits of radiation therapy.     PLAN:  - spaceOAR + fiducials by Dr. Alba  - [place] [facility] [place]  - plan for 5fx, treat at [facility]    He knows to call with any questions or concerns in the interim.    Elissa Iqbal MD PhD  , Radiation Oncology

## 2024-01-11 ENCOUNTER — APPOINTMENT (OUTPATIENT)
Dept: RADIATION ONCOLOGY | Facility: CLINIC | Age: 74
End: 2024-01-11
Payer: MEDICARE

## 2024-01-23 ENCOUNTER — TELEMEDICINE CLINICAL SUPPORT (OUTPATIENT)
Dept: PREADMISSION TESTING | Facility: HOSPITAL | Age: 74
End: 2024-01-23
Payer: MEDICARE

## 2024-01-23 RX ORDER — DEXTROMETHORPHAN HYDROBROMIDE, GUAIFENESIN 5; 100 MG/5ML; MG/5ML
650 LIQUID ORAL EVERY 8 HOURS PRN
COMMUNITY

## 2024-02-08 ENCOUNTER — PRE-ADMISSION TESTING (OUTPATIENT)
Dept: PREADMISSION TESTING | Facility: HOSPITAL | Age: 74
End: 2024-02-08
Payer: MEDICARE

## 2024-02-08 VITALS
BODY MASS INDEX: 28.61 KG/M2 | SYSTOLIC BLOOD PRESSURE: 137 MMHG | DIASTOLIC BLOOD PRESSURE: 83 MMHG | HEIGHT: 72 IN | OXYGEN SATURATION: 95 % | WEIGHT: 211.2 LBS | HEART RATE: 79 BPM | RESPIRATION RATE: 16 BRPM | TEMPERATURE: 99.3 F

## 2024-02-08 DIAGNOSIS — C61 PROSTATE CANCER (MULTI): Primary | ICD-10-CM

## 2024-02-08 DIAGNOSIS — Z01.818 PREOP TESTING: Primary | ICD-10-CM

## 2024-02-08 LAB
APPEARANCE UR: CLEAR
ATRIAL RATE: 73 BPM
BILIRUB UR STRIP.AUTO-MCNC: NEGATIVE MG/DL
COLOR UR: YELLOW
GLUCOSE UR STRIP.AUTO-MCNC: NEGATIVE MG/DL
HOLD SPECIMEN: NORMAL
KETONES UR STRIP.AUTO-MCNC: NEGATIVE MG/DL
LEUKOCYTE ESTERASE UR QL STRIP.AUTO: NEGATIVE
NITRITE UR QL STRIP.AUTO: NEGATIVE
P AXIS: 50 DEGREES
P OFFSET: 201 MS
P ONSET: 140 MS
PH UR STRIP.AUTO: 6 [PH]
PR INTERVAL: 154 MS
PROT UR STRIP.AUTO-MCNC: NEGATIVE MG/DL
Q ONSET: 217 MS
QRS COUNT: 12 BEATS
QRS DURATION: 88 MS
QT INTERVAL: 404 MS
QTC CALCULATION(BAZETT): 445 MS
QTC FREDERICIA: 431 MS
R AXIS: 73 DEGREES
RBC # UR STRIP.AUTO: NEGATIVE /UL
SP GR UR STRIP.AUTO: 1.02
T AXIS: 1 DEGREES
T OFFSET: 419 MS
UROBILINOGEN UR STRIP.AUTO-MCNC: 0.2 MG/DL
VENTRICULAR RATE: 73 BPM

## 2024-02-08 PROCEDURE — 99213 OFFICE O/P EST LOW 20 MIN: CPT | Performed by: NURSE PRACTITIONER

## 2024-02-08 PROCEDURE — 93005 ELECTROCARDIOGRAM TRACING: CPT

## 2024-02-08 PROCEDURE — 81003 URINALYSIS AUTO W/O SCOPE: CPT

## 2024-02-08 ASSESSMENT — ENCOUNTER SYMPTOMS
GASTROINTESTINAL NEGATIVE: 1
CARDIOVASCULAR NEGATIVE: 1
PSYCHIATRIC NEGATIVE: 1
NEUROLOGICAL NEGATIVE: 1
EYES NEGATIVE: 1
HEMATURIA: 0
HEMATOLOGIC/LYMPHATIC NEGATIVE: 1
BACK PAIN: 1
ALLERGIC/IMMUNOLOGIC NEGATIVE: 1
RESPIRATORY NEGATIVE: 1
ENDOCRINE NEGATIVE: 1

## 2024-02-08 ASSESSMENT — PAIN SCALES - GENERAL: PAINLEVEL_OUTOF10: 0 - NO PAIN

## 2024-02-08 ASSESSMENT — PAIN - FUNCTIONAL ASSESSMENT: PAIN_FUNCTIONAL_ASSESSMENT: 0-10

## 2024-02-08 NOTE — CPM/PAT H&P
No results found for this or any previous visit (from the past 24 hour(s)).     Assessment & Plan:    73 y.o.  male  scheduled for insertion of fiducial marker prostate on 2/13/24 with Dr. Alba for  prostate canner treatment.     12/7/2023 PSA 6.14      12/14/2023 MRI prostate, compared to 2/16/2023, noted enlargement of the PI-RADS 5 lesion in the left anterior lateral TZ with abutment of the anterior capsule without PILI.  No SVI or abnormal lymphadenopathy.    Presents to [facility] today for perioperative risk stratification and optimization    Pt denies dysuria, hematuria, fevers, chills, and myalgias. Patient denies Cx pain, SOB, CRESPO, and NVDC. Patient also denies Hx DVT/PE.     Current medications were reviewed and a presurgical medication schedule was provided. He has no questions at this time.     Past Medical History:   Diagnosis Date    Arthritis     BPH (benign prostatic hyperplasia)     Cataract     Colon polyp     Hemorrhoids     HL (hearing loss)     Hypertension     Personal history of other diseases of the circulatory system     History of hypertension    Personal history of other endocrine, nutritional and metabolic disease     History of diabetes mellitus    Prostate cancer (CMS/HCC) 03/2023    Rhinitis     Subluxation     spine       Past Surgical History:   Procedure Laterality Date    COLONOSCOPY      HERNIA REPAIR Right 2004    KNEE ARTHROSCOPY W/ DEBRIDEMENT  12/17/2013    Knee Arthroscopy (Therapeutic)    SHOULDER Left 2017    RCR    TONSILLECTOMY      TOTAL KNEE ARTHROPLASTY Bilateral 2022       Family History   Problem Relation Name Age of Onset    Hypertension Mother Kristal     Bilateral breast cancer Mother Kristal     Colon cancer Mother Kristal     Other (htn) Mother Kristal     Cancer Mother Kristal     Brain Aneurysm Mother Kristal     Parkinsonism Father      Parkinsonism Brother         No Known Allergies    Current Outpatient Medications on File Prior to Visit   Medication  Sig Dispense Refill    acetaminophen (Tylenol 8 HOUR) 650 mg ER tablet Take 1 tablet (650 mg) by mouth every 8 hours if needed for mild pain (1 - 3). Do not crush, chew, or split.      celecoxib (CeleBREX) 200 mg capsule Take 1 capsule (200 mg) by mouth once daily.      cloNIDine (Catapres-TTS) 0.3 mg/24 hr patch Place 1 patch on the skin 1 (one) time per week. 12 patch 1    cyanocobalamin, vitamin B-12, 5,000 mcg capsule Take 1,500 mcg by mouth once daily in the morning.      hydrALAZINE (Apresoline) 50 mg tablet Take 1 tablet (50 mg) by mouth 2 times a day. 180 tablet 1    multivitamin tablet Take 1 tablet by mouth once daily.      POTASSIUM CHLORIDE ORAL Take 5 mEq by mouth once daily.      saw palmetto 160 mg capsule Take 1 capsule (160 mg) by mouth once daily. 90 capsule 1    terazosin (Hytrin) 2 mg capsule Take 1 capsule (2 mg) by mouth once daily at bedtime. 90 capsule 1    triamterene-hydrochlorothiazid (Maxzide) 75-50 mg tablet Take 1 tablet by mouth once daily. 90 tablet 1     No current facility-administered medications on file prior to visit.     Results for orders placed or performed in visit on 02/08/24 (from the past 24 hour(s))   ECG 12 Lead   Result Value Ref Range    Ventricular Rate 73 BPM    Atrial Rate 73 BPM    IA Interval 154 ms    QRS Duration 88 ms    QT Interval 404 ms    QTC Calculation(Bazett) 445 ms    P Axis 50 degrees    R Axis 73 degrees    T Axis 1 degrees    QRS Count 12 beats    Q Onset 217 ms    P Onset 140 ms    P Offset 201 ms    T Offset 419 ms    QTC Fredericia 431 ms   Urinalysis with Reflex Culture and Microscopic   Result Value Ref Range    Color, Urine Yellow Straw, Yellow    Appearance, Urine Clear Clear    Specific Gravity, Urine 1.020 1.005 - 1.035    pH, Urine 6.0 5.0, 5.5, 6.0, 6.5, 7.0, 7.5, 8.0    Protein, Urine NEGATIVE NEGATIVE, TRACE mg/dL    Glucose, Urine NEGATIVE NEGATIVE mg/dL    Blood, Urine NEGATIVE NEGATIVE    Ketones, Urine NEGATIVE NEGATIVE mg/dL     Bilirubin, Urine NEGATIVE NEGATIVE    Urobilinogen, Urine 0.2 0.2, 1.0 mg/dL    Nitrite, Urine NEGATIVE NEGATIVE    Leukocyte Esterase, Urine NEGATIVE NEGATIVE       Vitals:    02/08/24 0917   BP: 137/83   Pulse: 79   Resp: 16   Temp: 37.4 °C (99.3 °F)   SpO2: 95%         Review of Systems   HENT: Negative.     Eyes: Negative.    Respiratory: Negative.     Cardiovascular: Negative.    Gastrointestinal: Negative.    Endocrine: Negative.    Genitourinary:  Negative for hematuria.        See HPI   Musculoskeletal:  Positive for back pain.        Tip of L great toe intermittent sharp non radiating pain no associated w activity, no h/o gout. Started 2 days ago, has not taken anything for pain.     H/o lumbar discopathy, His LB does ache but denies immobility, radiation, issues with incontinence   Skin: Negative.    Allergic/Immunologic: Negative.    Neurological: Negative.    Hematological: Negative.    Psychiatric/Behavioral: Negative.        Physical Exam  Vitals and nursing note reviewed.   Constitutional:       Appearance: Normal appearance. He is normal weight.   HENT:      Head: Normocephalic.      Nose: Nose normal.      Mouth/Throat:      Mouth: Mucous membranes are moist.      Pharynx: Oropharynx is clear.   Eyes:      Extraocular Movements: Extraocular movements intact.      Conjunctiva/sclera: Conjunctivae normal.      Pupils: Pupils are equal, round, and reactive to light.   Cardiovascular:      Rate and Rhythm: Normal rate and regular rhythm.      Pulses: Normal pulses.      Heart sounds: Normal heart sounds.   Pulmonary:      Effort: Pulmonary effort is normal.      Breath sounds: Normal breath sounds.   Abdominal:      General: Abdomen is flat. Bowel sounds are normal.      Palpations: Abdomen is soft.   Musculoskeletal:         General: Normal range of motion.      Cervical back: Normal range of motion and neck supple.   Skin:     General: Skin is warm and dry.      Findings: Rash present. Rash is  crusting and scaling.             Comments: callous   Neurological:      Mental Status: He is alert.   Psychiatric:         Attention and Perception: Attention and perception normal.         Mood and Affect: Mood normal.         Speech: Speech normal.         Behavior: Behavior normal.         Thought Content: Thought content normal.        PAT AIRWAY:   Airway:     Mallampati::  IV    TM distance::  >3 FB    Neck ROM::  Full      No results found for this or any previous visit (from the past 24 hour(s)).     Neuro:  H/o claustrophobia    HEENT/Airway:  No diagnosis or significant findings on chart review or clinical presentation and evaluation.     Cardiovascular:  11/17/2017 CAC 4 LAD  8/27/22 EKG ? Ant MI NSR  HTN  Hydralazine 50mg bid  Maxzide 75-50 mg daily  Clonidine 0.3mg/24 hr patch one per week    Pulmonary:  No diagnosis or significant findings on chart review or clinical presentation and evaluation.   ARISCAT: <26 points, 1.6% risk of in-hospital postoperative pulmonary complication  PRODIGY: Low risk for opioid induced respiratory depression  Discussed smoking cessation and deep breathing handout given    Renal:   No diagnosis or significant findings on chart review, or clinical presentation and evaluation.   Pt at Low risk for perioperative ZURI   CMP 12/2023 reviewed    Endocrine:  Abn TSH last TSH 5/2023 WNL  3  Hematologic:  Hgb 15.3  CBC 12/7/23 reviewed  , patient at Low risk for postoperative DVT. Pt supplied education/VTE handout    Gastrointestinal:   No diagnosis or significant findings on chart review or clinical presentation and evaluation.     Infectious disease:   No diagnosis or significant findings on chart review or clinical presentation and evaluation.     Musculoskeletal:   H/o displaced Lumbar disc  S/p TKR    Preoperative risk assessment  ASA II  METS: 58.2 9.9  RCRI: 0 points, 3.9%  risk for postoperative MACE   CYNDIE: 0.1% risk for postoperative MACE  RCRI-0 POINTS CLASS I RISK  3.9%  STOP-BANGS-4 POINTS LOW RISK FOR FAN  NSQIP-PREDICTED LENGTH OF STAY 0 DAYS  ARISCAT-3 POINTS LOW RISK 1.6%  DASI-58.2 POINTS. 9.9 METS  JHFRAT-6 POINTS LOW RISK FOR FALLS  EKG - NSR nonspecific ST/T wave abn, c/w prior EKG  CLEARANCE-NA  PAT TESTING-  12/7/23 CBC, CMP, A1c reviewed   Pending UA    *CLEARED FOR SURGERY PENDING LABS/EKG-LABS REVIEWED, STABLE. OK TO PROCEED    Anesthesia:  No anesthesia complications    denies dental issues  ex-smoker quit was one quarter PPD x 5 yrs  1  drinks per week  no  Drug abuse  denies  personal/family issues with Anesthesia    Labs & Imaging ordered:  UA, EKG    Overall, patient at low risk for the scheduled surgery. Discussed with patient medication instructions, NPO guidelines, and any questions or concerns. Patient does not require further workup prior to preceding with elective surgery, based on my evaluation . Face to Face patient contact time 20    MARGARITO Sheldon 2/8/2024 9:36 AM

## 2024-02-08 NOTE — PREPROCEDURE INSTRUCTIONS
Medication List            Accurate as of February 8, 2024  9:13 AM. Always use your most recent med list.                acetaminophen 650 mg ER tablet  Commonly known as: Tylenol 8 HOUR  Medication Adjustments for Surgery: Continue until night before surgery     celecoxib 200 mg capsule  Commonly known as: CeleBREX  Medication Adjustments for Surgery: Stop 7 days before surgery     cloNIDine 0.3 mg/24 hr patch  Commonly known as: Catapres-TTS  Place 1 patch on the skin 1 (one) time per week.  Medication Adjustments for Surgery: Other (Comment)  Notes to patient: No patch on day of surgery     cyanocobalamin (vitamin B-12) 5,000 mcg capsule  Medication Adjustments for Surgery: Stop 7 days before surgery     hydrALAZINE 50 mg tablet  Commonly known as: Apresoline  Take 1 tablet (50 mg) by mouth 2 times a day.  Medication Adjustments for Surgery: Take morning of surgery with sip of water, no other fluids     multivitamin tablet  Medication Adjustments for Surgery: Stop 7 days before surgery     POTASSIUM CHLORIDE ORAL  Medication Adjustments for Surgery: Other (Comment)  Notes to patient: Take until day prior to surgery     saw palmetto 160 mg capsule  Take 1 capsule (160 mg) by mouth once daily.  Medication Adjustments for Surgery: Stop 7 days before surgery     terazosin 2 mg capsule  Commonly known as: Hytrin  Take 1 capsule (2 mg) by mouth once daily at bedtime.  Medication Adjustments for Surgery: Continue until night before surgery     triamterene-hydrochlorothiazid 75-50 mg tablet  Commonly known as: Maxzide  Take 1 tablet by mouth once daily.  Medication Adjustments for Surgery: Take morning of surgery with sip of water, no other fluids                              NPO Instructions:    Do not eat any food after midnight the night before your surgery/procedure.    Additional Instructions:     Five Days before Surgery:  Review your medication instructions, stop indicated medications  Three Days before  Surgery:  Review your medication instructions, stop indicated medications  The Day before Surgery:  Review your medication instructions, stop indicated medications  You will be contacted regarding the time of your arrival to facility and surgery time  Do not eat any food after Midnight  Day of Surgery:  Review your medication instructions, take indicated medications  Wear  comfortable loose fitting clothing  Do not use moisturizers, creams, lotions or perfume  All jewelry and valuables should be left at home

## 2024-02-09 ENCOUNTER — ANESTHESIA EVENT (OUTPATIENT)
Dept: OPERATING ROOM | Facility: HOSPITAL | Age: 74
End: 2024-02-09
Payer: MEDICARE

## 2024-02-13 ENCOUNTER — HOSPITAL ENCOUNTER (OUTPATIENT)
Facility: HOSPITAL | Age: 74
Setting detail: OUTPATIENT SURGERY
Discharge: HOME | End: 2024-02-13
Attending: STUDENT IN AN ORGANIZED HEALTH CARE EDUCATION/TRAINING PROGRAM | Admitting: STUDENT IN AN ORGANIZED HEALTH CARE EDUCATION/TRAINING PROGRAM
Payer: MEDICARE

## 2024-02-13 ENCOUNTER — ANESTHESIA (OUTPATIENT)
Dept: OPERATING ROOM | Facility: HOSPITAL | Age: 74
End: 2024-02-13
Payer: MEDICARE

## 2024-02-13 VITALS
TEMPERATURE: 97.2 F | DIASTOLIC BLOOD PRESSURE: 67 MMHG | HEART RATE: 61 BPM | OXYGEN SATURATION: 99 % | RESPIRATION RATE: 16 BRPM | WEIGHT: 213.19 LBS | SYSTOLIC BLOOD PRESSURE: 141 MMHG | HEIGHT: 71 IN | BODY MASS INDEX: 29.85 KG/M2

## 2024-02-13 DIAGNOSIS — C61 PROSTATE CANCER (MULTI): Primary | ICD-10-CM

## 2024-02-13 PROBLEM — N20.0 NEPHROLITHIASIS: Status: RESOLVED | Noted: 2023-05-23 | Resolved: 2024-02-13

## 2024-02-13 PROBLEM — I73.9 PERIPHERAL VASCULAR DISEASE (CMS-HCC): Status: ACTIVE | Noted: 2024-02-13

## 2024-02-13 PROBLEM — E03.9 HYPOTHYROIDISM: Status: ACTIVE | Noted: 2024-02-13

## 2024-02-13 PROCEDURE — 76872 US TRANSRECTAL: CPT | Performed by: STUDENT IN AN ORGANIZED HEALTH CARE EDUCATION/TRAINING PROGRAM

## 2024-02-13 PROCEDURE — 2500000004 HC RX 250 GENERAL PHARMACY W/ HCPCS (ALT 636 FOR OP/ED): Performed by: STUDENT IN AN ORGANIZED HEALTH CARE EDUCATION/TRAINING PROGRAM

## 2024-02-13 PROCEDURE — 7100000002 HC RECOVERY ROOM TIME - EACH INCREMENTAL 1 MINUTE: Performed by: STUDENT IN AN ORGANIZED HEALTH CARE EDUCATION/TRAINING PROGRAM

## 2024-02-13 PROCEDURE — 2780000003 HC OR 278 NO HCPCS: Performed by: STUDENT IN AN ORGANIZED HEALTH CARE EDUCATION/TRAINING PROGRAM

## 2024-02-13 PROCEDURE — 55876 PLACE RT DEVICE/MARKER PROS: CPT | Performed by: STUDENT IN AN ORGANIZED HEALTH CARE EDUCATION/TRAINING PROGRAM

## 2024-02-13 PROCEDURE — A55874 PR TRANSPERINEAL PLACEMENT OF BIODEGRADABLE MATERIAL, PERI-PROSTATIC, SINGLE OR MULTIPLE: Performed by: ANESTHESIOLOGIST ASSISTANT

## 2024-02-13 PROCEDURE — 7100000010 HC PHASE TWO TIME - EACH INCREMENTAL 1 MINUTE: Performed by: STUDENT IN AN ORGANIZED HEALTH CARE EDUCATION/TRAINING PROGRAM

## 2024-02-13 PROCEDURE — 7100000001 HC RECOVERY ROOM TIME - INITIAL BASE CHARGE: Performed by: STUDENT IN AN ORGANIZED HEALTH CARE EDUCATION/TRAINING PROGRAM

## 2024-02-13 PROCEDURE — 2500000004 HC RX 250 GENERAL PHARMACY W/ HCPCS (ALT 636 FOR OP/ED): Performed by: ANESTHESIOLOGIST ASSISTANT

## 2024-02-13 PROCEDURE — A55874 PR TRANSPERINEAL PLACEMENT OF BIODEGRADABLE MATERIAL, PERI-PROSTATIC, SINGLE OR MULTIPLE: Performed by: ANESTHESIOLOGY

## 2024-02-13 PROCEDURE — 99100 ANES PT EXTEME AGE<1 YR&>70: CPT | Performed by: ANESTHESIOLOGY

## 2024-02-13 PROCEDURE — 3700000002 HC GENERAL ANESTHESIA TIME - EACH INCREMENTAL 1 MINUTE: Performed by: STUDENT IN AN ORGANIZED HEALTH CARE EDUCATION/TRAINING PROGRAM

## 2024-02-13 PROCEDURE — 3700000001 HC GENERAL ANESTHESIA TIME - INITIAL BASE CHARGE: Performed by: STUDENT IN AN ORGANIZED HEALTH CARE EDUCATION/TRAINING PROGRAM

## 2024-02-13 PROCEDURE — 2500000005 HC RX 250 GENERAL PHARMACY W/O HCPCS: Performed by: ANESTHESIOLOGIST ASSISTANT

## 2024-02-13 PROCEDURE — C1889 IMPLANT/INSERT DEVICE, NOC: HCPCS | Performed by: STUDENT IN AN ORGANIZED HEALTH CARE EDUCATION/TRAINING PROGRAM

## 2024-02-13 PROCEDURE — 3600000009 HC OR TIME - EACH INCREMENTAL 1 MINUTE - PROCEDURE LEVEL FOUR: Performed by: STUDENT IN AN ORGANIZED HEALTH CARE EDUCATION/TRAINING PROGRAM

## 2024-02-13 PROCEDURE — 3600000004 HC OR TIME - INITIAL BASE CHARGE - PROCEDURE LEVEL FOUR: Performed by: STUDENT IN AN ORGANIZED HEALTH CARE EDUCATION/TRAINING PROGRAM

## 2024-02-13 PROCEDURE — 7100000009 HC PHASE TWO TIME - INITIAL BASE CHARGE: Performed by: STUDENT IN AN ORGANIZED HEALTH CARE EDUCATION/TRAINING PROGRAM

## 2024-02-13 PROCEDURE — 55874 TPRNL PLMT BIODEGRDABL MATRL: CPT | Performed by: STUDENT IN AN ORGANIZED HEALTH CARE EDUCATION/TRAINING PROGRAM

## 2024-02-13 DEVICE — STERILE PLACEMENT NEEDLES (17GA ETW X 20CM) WITH BONE WAX AND (1.2 X 3MM) SOFT TISSUE GOLD MARKER [3]
Type: IMPLANTABLE DEVICE | Site: PROSTATE | Status: FUNCTIONAL
Brand: FIDUCIAL MARKER KIT

## 2024-02-13 RX ORDER — DEXAMETHASONE SODIUM PHOSPHATE 4 MG/ML
INJECTION, SOLUTION INTRA-ARTICULAR; INTRALESIONAL; INTRAMUSCULAR; INTRAVENOUS; SOFT TISSUE AS NEEDED
Status: DISCONTINUED | OUTPATIENT
Start: 2024-02-13 | End: 2024-02-13

## 2024-02-13 RX ORDER — FENTANYL CITRATE 50 UG/ML
50 INJECTION, SOLUTION INTRAMUSCULAR; INTRAVENOUS EVERY 5 MIN PRN
Status: DISCONTINUED | OUTPATIENT
Start: 2024-02-13 | End: 2024-02-13 | Stop reason: HOSPADM

## 2024-02-13 RX ORDER — MIDAZOLAM HYDROCHLORIDE 1 MG/ML
INJECTION, SOLUTION INTRAMUSCULAR; INTRAVENOUS AS NEEDED
Status: DISCONTINUED | OUTPATIENT
Start: 2024-02-13 | End: 2024-02-13

## 2024-02-13 RX ORDER — PROPOFOL 10 MG/ML
INJECTION, EMULSION INTRAVENOUS CONTINUOUS PRN
Status: DISCONTINUED | OUTPATIENT
Start: 2024-02-13 | End: 2024-02-13

## 2024-02-13 RX ORDER — KETOROLAC TROMETHAMINE 30 MG/ML
INJECTION, SOLUTION INTRAMUSCULAR; INTRAVENOUS AS NEEDED
Status: DISCONTINUED | OUTPATIENT
Start: 2024-02-13 | End: 2024-02-13

## 2024-02-13 RX ORDER — FENTANYL CITRATE 50 UG/ML
25 INJECTION, SOLUTION INTRAMUSCULAR; INTRAVENOUS EVERY 5 MIN PRN
Status: DISCONTINUED | OUTPATIENT
Start: 2024-02-13 | End: 2024-02-13 | Stop reason: HOSPADM

## 2024-02-13 RX ORDER — PROPOFOL 10 MG/ML
INJECTION, EMULSION INTRAVENOUS AS NEEDED
Status: DISCONTINUED | OUTPATIENT
Start: 2024-02-13 | End: 2024-02-13

## 2024-02-13 RX ORDER — LIDOCAINE HYDROCHLORIDE 20 MG/ML
INJECTION, SOLUTION INFILTRATION; PERINEURAL AS NEEDED
Status: DISCONTINUED | OUTPATIENT
Start: 2024-02-13 | End: 2024-02-13

## 2024-02-13 RX ORDER — SODIUM CHLORIDE, SODIUM LACTATE, POTASSIUM CHLORIDE, CALCIUM CHLORIDE 600; 310; 30; 20 MG/100ML; MG/100ML; MG/100ML; MG/100ML
100 INJECTION, SOLUTION INTRAVENOUS CONTINUOUS
Status: DISCONTINUED | OUTPATIENT
Start: 2024-02-13 | End: 2024-02-13 | Stop reason: HOSPADM

## 2024-02-13 RX ORDER — FENTANYL CITRATE 50 UG/ML
INJECTION, SOLUTION INTRAMUSCULAR; INTRAVENOUS AS NEEDED
Status: DISCONTINUED | OUTPATIENT
Start: 2024-02-13 | End: 2024-02-13

## 2024-02-13 RX ORDER — ONDANSETRON HYDROCHLORIDE 2 MG/ML
INJECTION, SOLUTION INTRAVENOUS AS NEEDED
Status: DISCONTINUED | OUTPATIENT
Start: 2024-02-13 | End: 2024-02-13

## 2024-02-13 RX ORDER — CEFAZOLIN SODIUM 2 G/100ML
2 INJECTION, SOLUTION INTRAVENOUS ONCE
Status: COMPLETED | OUTPATIENT
Start: 2024-02-13 | End: 2024-02-13

## 2024-02-13 RX ADMIN — FENTANYL CITRATE 50 MCG: 50 INJECTION INTRAMUSCULAR; INTRAVENOUS at 10:41

## 2024-02-13 RX ADMIN — LIDOCAINE HYDROCHLORIDE 50 MG: 20 INJECTION, SOLUTION INFILTRATION; PERINEURAL at 10:41

## 2024-02-13 RX ADMIN — CEFAZOLIN SODIUM 2 G: 2 INJECTION, SOLUTION INTRAVENOUS at 10:43

## 2024-02-13 RX ADMIN — FENTANYL CITRATE 25 MCG: 50 INJECTION INTRAMUSCULAR; INTRAVENOUS at 11:00

## 2024-02-13 RX ADMIN — PROPOFOL 50 MG: 10 INJECTION, EMULSION INTRAVENOUS at 10:41

## 2024-02-13 RX ADMIN — SODIUM CHLORIDE, POTASSIUM CHLORIDE, SODIUM LACTATE AND CALCIUM CHLORIDE: 600; 310; 30; 20 INJECTION, SOLUTION INTRAVENOUS at 10:37

## 2024-02-13 RX ADMIN — KETOROLAC TROMETHAMINE 30 MG: 30 INJECTION INTRAMUSCULAR; INTRAVENOUS at 11:09

## 2024-02-13 RX ADMIN — ONDANSETRON 4 MG: 2 INJECTION INTRAMUSCULAR; INTRAVENOUS at 10:43

## 2024-02-13 RX ADMIN — FENTANYL CITRATE 25 MCG: 50 INJECTION INTRAMUSCULAR; INTRAVENOUS at 10:56

## 2024-02-13 RX ADMIN — DEXAMETHASONE SODIUM PHOSPHATE 4 MG: 4 INJECTION, SOLUTION INTRAMUSCULAR; INTRAVENOUS at 10:43

## 2024-02-13 RX ADMIN — MIDAZOLAM 2 MG: 1 INJECTION INTRAMUSCULAR; INTRAVENOUS at 10:37

## 2024-02-13 RX ADMIN — PROPOFOL 100 MCG/KG/MIN: 10 INJECTION, EMULSION INTRAVENOUS at 10:41

## 2024-02-13 SDOH — HEALTH STABILITY: MENTAL HEALTH: CURRENT SMOKER: 0

## 2024-02-13 ASSESSMENT — COLUMBIA-SUICIDE SEVERITY RATING SCALE - C-SSRS
6. HAVE YOU EVER DONE ANYTHING, STARTED TO DO ANYTHING, OR PREPARED TO DO ANYTHING TO END YOUR LIFE?: NO
2. HAVE YOU ACTUALLY HAD ANY THOUGHTS OF KILLING YOURSELF?: NO
1. IN THE PAST MONTH, HAVE YOU WISHED YOU WERE DEAD OR WISHED YOU COULD GO TO SLEEP AND NOT WAKE UP?: NO

## 2024-02-13 ASSESSMENT — PAIN - FUNCTIONAL ASSESSMENT
PAIN_FUNCTIONAL_ASSESSMENT: 0-10

## 2024-02-13 ASSESSMENT — PAIN SCALES - GENERAL
PAINLEVEL_OUTOF10: 0 - NO PAIN
PAIN_LEVEL: 0
PAINLEVEL_OUTOF10: 0 - NO PAIN

## 2024-02-13 NOTE — ANESTHESIA POSTPROCEDURE EVALUATION
"Patient: Anthony Henry \"Jack\"    Procedure Summary       Date: 02/13/24 Room / Location: [facility] 02 / [facility]    Anesthesia Start: 1037 Anesthesia Stop: 1122    Procedure: Insertion Fiducial Marker Prostate Diagnosis:       Prostate cancer (CMS/HCC)      (Prostate cancer (CMS/HCC) [C61])    Surgeons: Shmuel Alba MD Responsible Provider: Nazia Ortiz MD MPH    Anesthesia Type: general ASA Status: 3            Anesthesia Type: general    Vitals Value Taken Time   /88 02/13/24 1121   Temp 36 02/13/24 1124   Pulse 64 02/13/24 1124   Resp 10 02/13/24 1124   SpO2 99 % 02/13/24 1124   Vitals shown include unvalidated device data.    Anesthesia Post Evaluation    Patient location during evaluation: PACU  Patient participation: complete - patient participated  Level of consciousness: awake and alert  Pain score: 0  Pain management: adequate  Multimodal analgesia pain management approach  Airway patency: patent  Two or more strategies used to mitigate risk of obstructive sleep apnea  Cardiovascular status: acceptable  Respiratory status: acceptable  Hydration status: acceptable  Postoperative Nausea and Vomiting: none      There were no known notable events for this encounter.    "

## 2024-02-13 NOTE — H&P
"History Of Present Illness  Jack Henry is a 73 y.o. male presenting with prostate cancer.     Past Medical History  Past Medical History:   Diagnosis Date    Arthritis     BPH (benign prostatic hyperplasia)     Cataract     Colon polyp     Hemorrhoids     HL (hearing loss)     Hypertension     Personal history of other diseases of the circulatory system     History of hypertension    Personal history of other endocrine, nutritional and metabolic disease     History of diabetes mellitus    Prostate cancer (CMS/HCC) 03/2023    Rhinitis     Subluxation     spine       Surgical History  Past Surgical History:   Procedure Laterality Date    COLONOSCOPY      HERNIA REPAIR Right 2004    KNEE ARTHROSCOPY W/ DEBRIDEMENT  12/17/2013    Knee Arthroscopy (Therapeutic)    SHOULDER Left 2017    RCR    TONSILLECTOMY      TOTAL KNEE ARTHROPLASTY Bilateral 2022        Social History  He reports that he quit smoking about 11 years ago. His smoking use included cigarettes and cigars. He has a 1.25 pack-year smoking history. He has never used smokeless tobacco. He reports current alcohol use of about 1.0 standard drink of alcohol per week. He reports that he does not use drugs.    Family History  Family History   Problem Relation Name Age of Onset    Hypertension Mother Kristal     Bilateral breast cancer Mother Kristal     Colon cancer Mother Kristal     Other (htn) Mother Kristal     Cancer Mother Kristal     Brain Aneurysm Mother Kristal     Parkinsonism Father      Parkinsonism Brother          Allergies  Patient has no known allergies.    Review of Systems     Physical Exam     Last Recorded Vitals  Blood pressure 152/86, pulse 73, temperature 36.3 °C (97.3 °F), temperature source Temporal, resp. rate 16, height 1.803 m (5' 11\"), weight 96.7 kg (213 lb 3 oz), SpO2 99 %.    Relevant Results             Assessment/Plan   Principal Problem:    Prostate cancer (CMS/HCC)  Active Problems:    Hypothyroidism    Peripheral " vascular disease (CMS/HCC)      Spaceoar and fiducials       I spent  minutes in the professional and overall care of this patient.      Shmuel Alba MD

## 2024-02-13 NOTE — PERIOPERATIVE NURSING NOTE
1145 Patient awake slightly drowsy able to follow commands. No active bleeding apparent. Vitals stable no pain or nausea  SDS updated  1146 To SDS..

## 2024-02-13 NOTE — ANESTHESIA PREPROCEDURE EVALUATION
"Patient: Anthony Henry \"Jack\"    Procedure Information       Date/Time: 02/13/24 0930    Procedure: Insertion Fiducial Marker Prostate    Location: [facility] / [facility]    Surgeons: Shmuel Alba MD            Relevant Problems   Anesthesia (within normal limits)      Cardiovascular   (+) Peripheral vascular disease (CMS/HCC) (ANAM - has renal art stent)   (+) Primary hypertension      Endocrine  Pre-DM, no meds   (+) Hypothyroidism (No meds yet)      GI (within normal limits)      /Renal  Denies kidney stones, h/o ANAM w/renal art stent  Elevated PSA   (+) Nephrolithiasis (Resolved)      Neuro/Psych   (+) Claustrophobia      Pulmonary (within normal limits)  Remote tob      GI/Hepatic (within normal limits)      Hematology (within normal limits)      Musculoskeletal   (+) Displacement of lumbar intervertebral disc without myelopathy   (+) Osteoarthritis of knees, bilateral   (+) Spinal stenosis, lumbar region, without neurogenic claudication      Eyes, Ears, Nose, and Throat (within normal limits)      Infectious Disease (within normal limits)      Other   (+) Arthritis of left knee   (+) Arthritis of right knee     Past Surgical History:   Procedure Laterality Date   • COLONOSCOPY     • HERNIA REPAIR Right 2004   • KNEE ARTHROSCOPY W/ DEBRIDEMENT  12/17/2013    Knee Arthroscopy (Therapeutic)   • SHOULDER Left 2017    RCR   • TONSILLECTOMY     • TOTAL KNEE ARTHROPLASTY Bilateral 2022       Clinical information reviewed:   Tobacco  Allergies  Meds   Med Hx  Surg Hx   Fam Hx  Soc Hx        NPO Detail:  NPO/Void Status  Date of Last Liquid: 02/12/24  Time of Last Liquid: 1800  Date of Last Solid: 02/12/24  Time of Last Solid: 0800  Time of Last Void: 0700         Physical Exam    Airway  Mallampati: IV  TM distance: >3 FB  Neck ROM: full     Cardiovascular - normal exam     Dental    Pulmonary - normal exam     Abdominal - normal exam         Anesthesia Plan    History of general anesthesia?: " yes  History of complications of general anesthesia?: no    ASA 3     general     The patient is not a current smoker.  Patient was not previously instructed to abstain from smoking on day of procedure.  Patient did not smoke on day of procedure.  Education provided regarding risk of obstructive sleep apnea.  intravenous induction   Anesthetic plan and risks discussed with patient and spouse.  Use of blood products discussed with patient and spouse who consented to blood products.    Plan discussed with CRNA and CAA.

## 2024-02-13 NOTE — PERIOPERATIVE NURSING NOTE
1115 Arrives to pacu 1 with AA. Lungs room air saturation 93-90 Placed on nrb mask at 10 liters with good response. Periods of apnea apparent. Loud snore.  Lungs clear when snoring periodically stops. Unresponsive to stimuli. Warm blankets for comfort.   1130 Opens eyes to verba, alert times 3. Answers questions appropriately. Denies pain and nausea falls back to sleep without intervention.

## 2024-02-13 NOTE — ANESTHESIA PROCEDURE NOTES
Airway  Date/Time: 2/13/2024 10:47 AM  Urgency: elective    Airway not difficult    Staffing  Performed: CAA   Authorized by: Nazia Ortiz MD MPH    Performed by: JACKIE Arceo  Patient location during procedure: OR    Final Airway Details  Final airway type: mask          Additional Comments  NRB with OA placed. Atraumatic

## 2024-02-13 NOTE — OP NOTE
"Insertion Fiducial Marker Prostate Operative Note     Date: 2024  OR Location: [facility]    Name: Anthony Henry \"Romario", : 1950, Age: 73 y.o., MRN: [id number], Sex: male    Diagnosis  Pre-op Diagnosis     * Prostate cancer (CMS/HCC) [C61] Post-op Diagnosis     * Prostate cancer (CMS/HCC) [C61]     Procedures  Insertion Fiducial Marker Prostate  43681 - MD PLMT INTERSTITIAL DEV RADIAT TX PROSTATE 1/MULT    MD TRANSPERINEAL PLMT BIODEGRADABLE MATRL 1/MLT NJX [[id number]]  Surgeons      * Shmuel Alba - Primary         Preoperative Diagnosis  Prostate cancer.       Postoperative Diagnosis  Same.       Procedure Performed  SpaceOAR and Fiducial placement, Transrectal ultrasound .   Surgeon  Shmuel Alba MD ([id number]) - Urology.   Assisted by  N/A.      Details of Procedure     Anesthesia  MAC.   Estimated Blood Loss (ml)  5.   Specimen(s) Removed  None.   Drain(s)  None.   Implant(s)  None.   Complications  None.   Indications (History)  Prostate cancer.   Findings of Procedure  45g heterogenous.   Description of Procedure  After administration of anesthesia and antibiotics, the patient was placed in the dorsal lithotomy position and prepped and draped in the usual sterile fashion. A prostate block was performed and transrectal ultrasound using a stepper. The prostate was measured. There were some hypoechoic areas. Fiducials were placed at the base, apex and mid prostate. The perirectal fat was identified using the finder needle with excellent dispersion of saline at the mid prostate. SpaceOAR Alisa was then placed in this space. Excellent placement was confirmed in both the transverse and saggital planes. There was no puncture of the rectal wall during the procedure. .       "

## 2024-02-14 ASSESSMENT — PAIN SCALES - GENERAL: PAINLEVEL_OUTOF10: 5 - MODERATE PAIN

## 2024-02-16 ENCOUNTER — HOSPITAL ENCOUNTER (OUTPATIENT)
Dept: RADIOLOGY | Facility: EXTERNAL LOCATION | Age: 74
Discharge: HOME | End: 2024-02-16

## 2024-02-16 DIAGNOSIS — C61 MALIGNANT NEOPLASM OF PROSTATE (MULTI): ICD-10-CM

## 2024-02-17 DIAGNOSIS — M17.0 OSTEOARTHRITIS OF BOTH KNEES, UNSPECIFIED OSTEOARTHRITIS TYPE: ICD-10-CM

## 2024-02-19 ENCOUNTER — HOSPITAL ENCOUNTER (OUTPATIENT)
Dept: RADIATION ONCOLOGY | Facility: HOSPITAL | Age: 74
Setting detail: RADIATION/ONCOLOGY SERIES
Discharge: HOME | End: 2024-02-19
Payer: MEDICARE

## 2024-02-19 DIAGNOSIS — C61 MALIGNANT NEOPLASM OF PROSTATE (MULTI): Primary | ICD-10-CM

## 2024-02-19 PROCEDURE — 77290 THER RAD SIMULAJ FIELD CPLX: CPT | Performed by: RADIOLOGY

## 2024-02-19 PROCEDURE — 77334 RADIATION TREATMENT AID(S): CPT | Performed by: RADIOLOGY

## 2024-02-19 RX ORDER — CELECOXIB 200 MG/1
200 CAPSULE ORAL DAILY
Qty: 90 CAPSULE | Refills: 1 | Status: SHIPPED | OUTPATIENT
Start: 2024-02-19 | End: 2024-05-21 | Stop reason: SDUPTHER

## 2024-02-23 ENCOUNTER — HOSPITAL ENCOUNTER (OUTPATIENT)
Dept: RADIATION ONCOLOGY | Facility: HOSPITAL | Age: 74
Setting detail: RADIATION/ONCOLOGY SERIES
Discharge: HOME | End: 2024-02-23
Payer: MEDICARE

## 2024-02-23 PROCEDURE — 77300 RADIATION THERAPY DOSE PLAN: CPT | Performed by: STUDENT IN AN ORGANIZED HEALTH CARE EDUCATION/TRAINING PROGRAM

## 2024-02-23 PROCEDURE — 77295 3-D RADIOTHERAPY PLAN: CPT | Performed by: STUDENT IN AN ORGANIZED HEALTH CARE EDUCATION/TRAINING PROGRAM

## 2024-02-23 PROCEDURE — 77334 RADIATION TREATMENT AID(S): CPT | Performed by: STUDENT IN AN ORGANIZED HEALTH CARE EDUCATION/TRAINING PROGRAM

## 2024-02-25 ENCOUNTER — APPOINTMENT (OUTPATIENT)
Dept: RADIOLOGY | Facility: HOSPITAL | Age: 74
End: 2024-02-25
Payer: MEDICARE

## 2024-02-25 ENCOUNTER — HOSPITAL ENCOUNTER (OUTPATIENT)
Facility: HOSPITAL | Age: 74
Setting detail: OBSERVATION
Discharge: HOME | End: 2024-02-26
Attending: EMERGENCY MEDICINE | Admitting: FAMILY MEDICINE
Payer: MEDICARE

## 2024-02-25 ENCOUNTER — HOSPITAL ENCOUNTER (EMERGENCY)
Facility: HOSPITAL | Age: 74
End: 2024-02-25
Payer: MEDICARE

## 2024-02-25 ENCOUNTER — APPOINTMENT (OUTPATIENT)
Dept: CARDIOLOGY | Facility: HOSPITAL | Age: 74
End: 2024-02-25
Payer: MEDICARE

## 2024-02-25 DIAGNOSIS — S22.42XA CLOSED FRACTURE OF MULTIPLE RIBS OF LEFT SIDE, INITIAL ENCOUNTER: ICD-10-CM
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
## 2024-02-25 DIAGNOSIS — I10 PRIMARY HYPERTENSION: ICD-10-CM
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
## 2024-02-25 LAB
ALBUMIN SERPL BCP-MCNC: 4.3 G/DL (ref 3.4–5)
ALP SERPL-CCNC: 68 U/L (ref 33–136)
ALT SERPL W P-5'-P-CCNC: 25 U/L (ref 10–52)
ANION GAP SERPL CALC-SCNC: 13 MMOL/L (ref 10–20)
APTT PPP: 28 SECONDS (ref 27–38)
AST SERPL W P-5'-P-CCNC: 28 U/L (ref 9–39)
BASOPHILS # BLD AUTO: 0.06 X10*3/UL (ref 0–0.1)
BASOPHILS NFR BLD AUTO: 0.3 %
BILIRUB SERPL-MCNC: 0.5 MG/DL (ref 0–1.2)
BUN SERPL-MCNC: 23 MG/DL (ref 6–23)
CALCIUM SERPL-MCNC: 9.5 MG/DL (ref 8.6–10.3)
CARDIAC TROPONIN I PNL SERPL HS: 5 NG/L (ref 0–20)
CHLORIDE SERPL-SCNC: 103 MMOL/L (ref 98–107)
CO2 SERPL-SCNC: 26 MMOL/L (ref 21–32)
CREAT SERPL-MCNC: 1.26 MG/DL (ref 0.5–1.3)
EGFRCR SERPLBLD CKD-EPI 2021: 60 ML/MIN/1.73M*2
EOSINOPHIL # BLD AUTO: 0.05 X10*3/UL (ref 0–0.4)
EOSINOPHIL NFR BLD AUTO: 0.3 %
ERYTHROCYTE [DISTWIDTH] IN BLOOD BY AUTOMATED COUNT: 12.4 % (ref 11.5–14.5)
FLUAV RNA RESP QL NAA+PROBE: NOT DETECTED
FLUBV RNA RESP QL NAA+PROBE: NOT DETECTED
GLUCOSE SERPL-MCNC: 108 MG/DL (ref 74–99)
HCT VFR BLD AUTO: 49.2 % (ref 41–52)
HGB BLD-MCNC: 16.5 G/DL (ref 13.5–17.5)
IMM GRANULOCYTES # BLD AUTO: 0.16 X10*3/UL (ref 0–0.5)
IMM GRANULOCYTES NFR BLD AUTO: 0.9 % (ref 0–0.9)
INR PPP: 1.1 (ref 0.9–1.1)
LYMPHOCYTES # BLD AUTO: 0.85 X10*3/UL (ref 0.8–3)
LYMPHOCYTES NFR BLD AUTO: 4.8 %
MCH RBC QN AUTO: 31.1 PG (ref 26–34)
MCHC RBC AUTO-ENTMCNC: 33.5 G/DL (ref 32–36)
MCV RBC AUTO: 93 FL (ref 80–100)
MONOCYTES # BLD AUTO: 1.09 X10*3/UL (ref 0.05–0.8)
MONOCYTES NFR BLD AUTO: 6.1 %
NEUTROPHILS # BLD AUTO: 15.64 X10*3/UL (ref 1.6–5.5)
NEUTROPHILS NFR BLD AUTO: 87.6 %
NRBC BLD-RTO: 0 /100 WBCS (ref 0–0)
PLATELET # BLD AUTO: 199 X10*3/UL (ref 150–450)
POTASSIUM SERPL-SCNC: 4.2 MMOL/L (ref 3.5–5.3)
PROT SERPL-MCNC: 6.9 G/DL (ref 6.4–8.2)
PROTHROMBIN TIME: 12.3 SECONDS (ref 9.8–12.8)
RBC # BLD AUTO: 5.3 X10*6/UL (ref 4.5–5.9)
SARS-COV-2 RNA RESP QL NAA+PROBE: NOT DETECTED
SODIUM SERPL-SCNC: 138 MMOL/L (ref 136–145)
WBC # BLD AUTO: 17.9 X10*3/UL (ref 4.4–11.3)

## 2024-02-25 PROCEDURE — 71045 X-RAY EXAM CHEST 1 VIEW: CPT | Performed by: RADIOLOGY

## 2024-02-25 PROCEDURE — 72125 CT NECK SPINE W/O DYE: CPT | Performed by: RADIOLOGY

## 2024-02-25 PROCEDURE — 73080 X-RAY EXAM OF ELBOW: CPT | Mod: LEFT SIDE | Performed by: RADIOLOGY

## 2024-02-25 PROCEDURE — 93005 ELECTROCARDIOGRAM TRACING: CPT

## 2024-02-25 PROCEDURE — 2500000004 HC RX 250 GENERAL PHARMACY W/ HCPCS (ALT 636 FOR OP/ED)

## 2024-02-25 PROCEDURE — G0378 HOSPITAL OBSERVATION PER HR: HCPCS

## 2024-02-25 PROCEDURE — 71045 X-RAY EXAM CHEST 1 VIEW: CPT | Mod: 59

## 2024-02-25 PROCEDURE — 73080 X-RAY EXAM OF ELBOW: CPT | Mod: LT

## 2024-02-25 PROCEDURE — 85610 PROTHROMBIN TIME: CPT

## 2024-02-25 PROCEDURE — 80053 COMPREHEN METABOLIC PANEL: CPT | Performed by: EMERGENCY MEDICINE

## 2024-02-25 PROCEDURE — 72131 CT LUMBAR SPINE W/O DYE: CPT | Performed by: RADIOLOGY

## 2024-02-25 PROCEDURE — 84484 ASSAY OF TROPONIN QUANT: CPT | Performed by: EMERGENCY MEDICINE

## 2024-02-25 PROCEDURE — 2500000004 HC RX 250 GENERAL PHARMACY W/ HCPCS (ALT 636 FOR OP/ED): Performed by: EMERGENCY MEDICINE

## 2024-02-25 PROCEDURE — 2550000001 HC RX 255 CONTRASTS

## 2024-02-25 PROCEDURE — 70450 CT HEAD/BRAIN W/O DYE: CPT | Performed by: RADIOLOGY

## 2024-02-25 PROCEDURE — 2500000005 HC RX 250 GENERAL PHARMACY W/O HCPCS

## 2024-02-25 PROCEDURE — 85730 THROMBOPLASTIN TIME PARTIAL: CPT

## 2024-02-25 PROCEDURE — 96374 THER/PROPH/DIAG INJ IV PUSH: CPT

## 2024-02-25 PROCEDURE — 36415 COLL VENOUS BLD VENIPUNCTURE: CPT | Performed by: EMERGENCY MEDICINE

## 2024-02-25 PROCEDURE — 96361 HYDRATE IV INFUSION ADD-ON: CPT

## 2024-02-25 PROCEDURE — 85025 COMPLETE CBC W/AUTO DIFF WBC: CPT | Performed by: EMERGENCY MEDICINE

## 2024-02-25 PROCEDURE — 36415 COLL VENOUS BLD VENIPUNCTURE: CPT

## 2024-02-25 PROCEDURE — 72131 CT LUMBAR SPINE W/O DYE: CPT

## 2024-02-25 PROCEDURE — 2550000001 HC RX 255 CONTRASTS: Performed by: EMERGENCY MEDICINE

## 2024-02-25 PROCEDURE — 96375 TX/PRO/DX INJ NEW DRUG ADDON: CPT

## 2024-02-25 PROCEDURE — 9420000001 HC RT PATIENT EDUCATION 5 MIN

## 2024-02-25 PROCEDURE — 74177 CT ABD & PELVIS W/CONTRAST: CPT | Performed by: RADIOLOGY

## 2024-02-25 PROCEDURE — 2500000001 HC RX 250 WO HCPCS SELF ADMINISTERED DRUGS (ALT 637 FOR MEDICARE OP)

## 2024-02-25 PROCEDURE — 70450 CT HEAD/BRAIN W/O DYE: CPT | Mod: 59

## 2024-02-25 PROCEDURE — 74177 CT ABD & PELVIS W/CONTRAST: CPT

## 2024-02-25 PROCEDURE — 87636 SARSCOV2 & INF A&B AMP PRB: CPT | Performed by: EMERGENCY MEDICINE

## 2024-02-25 PROCEDURE — 72128 CT CHEST SPINE W/O DYE: CPT

## 2024-02-25 PROCEDURE — 96376 TX/PRO/DX INJ SAME DRUG ADON: CPT

## 2024-02-25 PROCEDURE — 72128 CT CHEST SPINE W/O DYE: CPT | Performed by: RADIOLOGY

## 2024-02-25 PROCEDURE — 71260 CT THORAX DX C+: CPT | Performed by: RADIOLOGY

## 2024-02-25 PROCEDURE — 72125 CT NECK SPINE W/O DYE: CPT

## 2024-02-25 PROCEDURE — 99285 EMERGENCY DEPT VISIT HI MDM: CPT | Mod: 25

## 2024-02-25 RX ORDER — SODIUM CHLORIDE, SODIUM LACTATE, POTASSIUM CHLORIDE, CALCIUM CHLORIDE 600; 310; 30; 20 MG/100ML; MG/100ML; MG/100ML; MG/100ML
100 INJECTION, SOLUTION INTRAVENOUS CONTINUOUS
Status: DISCONTINUED | OUTPATIENT
Start: 2024-02-26 | End: 2024-02-26

## 2024-02-25 RX ORDER — HYDROMORPHONE HYDROCHLORIDE 1 MG/ML
0.6 INJECTION, SOLUTION INTRAMUSCULAR; INTRAVENOUS; SUBCUTANEOUS EVERY 4 HOURS PRN
Status: DISCONTINUED | OUTPATIENT
Start: 2024-02-25 | End: 2024-02-26 | Stop reason: HOSPADM

## 2024-02-25 RX ORDER — OXYCODONE HYDROCHLORIDE 5 MG/1
5 TABLET ORAL EVERY 6 HOURS PRN
Status: DISCONTINUED | OUTPATIENT
Start: 2024-02-25 | End: 2024-02-26

## 2024-02-25 RX ORDER — ACETAMINOPHEN 325 MG/1
650 TABLET ORAL ONCE
Status: COMPLETED | OUTPATIENT
Start: 2024-02-25 | End: 2024-02-25

## 2024-02-25 RX ORDER — CLONIDINE 0.3 MG/24H
1 PATCH, EXTENDED RELEASE TRANSDERMAL
Status: DISCONTINUED | OUTPATIENT
Start: 2024-02-26 | End: 2024-02-26 | Stop reason: HOSPADM

## 2024-02-25 RX ORDER — ACETAMINOPHEN 160 MG/5ML
650 SOLUTION ORAL EVERY 4 HOURS PRN
Status: DISCONTINUED | OUTPATIENT
Start: 2024-02-25 | End: 2024-02-26 | Stop reason: HOSPADM

## 2024-02-25 RX ORDER — GUAIFENESIN 600 MG/1
600 TABLET, EXTENDED RELEASE ORAL EVERY 12 HOURS PRN
Status: DISCONTINUED | OUTPATIENT
Start: 2024-02-25 | End: 2024-02-26 | Stop reason: HOSPADM

## 2024-02-25 RX ORDER — LIDOCAINE 560 MG/1
2 PATCH PERCUTANEOUS; TOPICAL; TRANSDERMAL ONCE
Status: COMPLETED | OUTPATIENT
Start: 2024-02-25 | End: 2024-02-26

## 2024-02-25 RX ORDER — ONDANSETRON HYDROCHLORIDE 2 MG/ML
4 INJECTION, SOLUTION INTRAVENOUS EVERY 8 HOURS PRN
Status: DISCONTINUED | OUTPATIENT
Start: 2024-02-25 | End: 2024-02-26 | Stop reason: HOSPADM

## 2024-02-25 RX ORDER — HYDRALAZINE HYDROCHLORIDE 50 MG/1
50 TABLET, FILM COATED ORAL 2 TIMES DAILY
Status: DISCONTINUED | OUTPATIENT
Start: 2024-02-25 | End: 2024-02-26 | Stop reason: HOSPADM

## 2024-02-25 RX ORDER — HYDROMORPHONE HYDROCHLORIDE 1 MG/ML
1 INJECTION, SOLUTION INTRAMUSCULAR; INTRAVENOUS; SUBCUTANEOUS ONCE
Status: COMPLETED | OUTPATIENT
Start: 2024-02-25 | End: 2024-02-25

## 2024-02-25 RX ORDER — ENOXAPARIN SODIUM 100 MG/ML
40 INJECTION SUBCUTANEOUS EVERY 24 HOURS
Status: DISCONTINUED | OUTPATIENT
Start: 2024-02-26 | End: 2024-02-26 | Stop reason: HOSPADM

## 2024-02-25 RX ORDER — TRIAMTERENE/HYDROCHLOROTHIAZID 37.5-25 MG
2 TABLET ORAL DAILY
Status: DISCONTINUED | OUTPATIENT
Start: 2024-02-26 | End: 2024-02-26 | Stop reason: HOSPADM

## 2024-02-25 RX ORDER — ACETAMINOPHEN 325 MG/1
650 TABLET ORAL EVERY 4 HOURS PRN
Status: DISCONTINUED | OUTPATIENT
Start: 2024-02-25 | End: 2024-02-26 | Stop reason: HOSPADM

## 2024-02-25 RX ORDER — ONDANSETRON HYDROCHLORIDE 2 MG/ML
4 INJECTION, SOLUTION INTRAVENOUS ONCE
Status: COMPLETED | OUTPATIENT
Start: 2024-02-25 | End: 2024-02-25

## 2024-02-25 RX ORDER — BISMUTH SUBSALICYLATE 262 MG
1 TABLET,CHEWABLE ORAL DAILY
Status: DISCONTINUED | OUTPATIENT
Start: 2024-02-26 | End: 2024-02-25 | Stop reason: CLARIF

## 2024-02-25 RX ORDER — ONDANSETRON 4 MG/1
4 TABLET, FILM COATED ORAL EVERY 8 HOURS PRN
Status: DISCONTINUED | OUTPATIENT
Start: 2024-02-25 | End: 2024-02-26 | Stop reason: HOSPADM

## 2024-02-25 RX ORDER — MULTIVIT-MIN/IRON FUM/FOLIC AC 7.5 MG-4
1 TABLET ORAL DAILY
Status: DISCONTINUED | OUTPATIENT
Start: 2024-02-26 | End: 2024-02-26 | Stop reason: HOSPADM

## 2024-02-25 RX ORDER — ACETAMINOPHEN 650 MG/1
650 SUPPOSITORY RECTAL EVERY 4 HOURS PRN
Status: DISCONTINUED | OUTPATIENT
Start: 2024-02-25 | End: 2024-02-26 | Stop reason: HOSPADM

## 2024-02-25 RX ORDER — FAMOTIDINE 10 MG/ML
20 INJECTION INTRAVENOUS 2 TIMES DAILY
Status: DISCONTINUED | OUTPATIENT
Start: 2024-02-26 | End: 2024-02-26 | Stop reason: HOSPADM

## 2024-02-25 RX ORDER — DIPHENHYDRAMINE HCL 25 MG
CAPSULE ORAL
Status: COMPLETED
Start: 2024-02-25 | End: 2024-02-25

## 2024-02-25 RX ORDER — LANOLIN ALCOHOL/MO/W.PET/CERES
1000 CREAM (GRAM) TOPICAL DAILY
Status: DISCONTINUED | OUTPATIENT
Start: 2024-02-26 | End: 2024-02-26 | Stop reason: HOSPADM

## 2024-02-25 RX ORDER — POTASSIUM CHLORIDE 20 MEQ/1
10 TABLET, EXTENDED RELEASE ORAL DAILY
Status: DISCONTINUED | OUTPATIENT
Start: 2024-02-26 | End: 2024-02-26 | Stop reason: HOSPADM

## 2024-02-25 RX ORDER — DIPHENHYDRAMINE HCL 25 MG
25 CAPSULE ORAL ONCE
Status: COMPLETED | OUTPATIENT
Start: 2024-02-25 | End: 2024-02-25

## 2024-02-25 RX ORDER — POLYETHYLENE GLYCOL 3350 17 G/17G
17 POWDER, FOR SOLUTION ORAL DAILY
Status: DISCONTINUED | OUTPATIENT
Start: 2024-02-26 | End: 2024-02-26 | Stop reason: HOSPADM

## 2024-02-25 RX ORDER — TALC
3 POWDER (GRAM) TOPICAL DAILY
Status: DISCONTINUED | OUTPATIENT
Start: 2024-02-26 | End: 2024-02-26 | Stop reason: HOSPADM

## 2024-02-25 RX ORDER — MORPHINE SULFATE 4 MG/ML
4 INJECTION, SOLUTION INTRAMUSCULAR; INTRAVENOUS ONCE
Status: COMPLETED | OUTPATIENT
Start: 2024-02-25 | End: 2024-02-25

## 2024-02-25 RX ORDER — HYDROMORPHONE HYDROCHLORIDE 1 MG/ML
INJECTION, SOLUTION INTRAMUSCULAR; INTRAVENOUS; SUBCUTANEOUS
Status: COMPLETED
Start: 2024-02-25 | End: 2024-02-25

## 2024-02-25 RX ORDER — FAMOTIDINE 20 MG/1
20 TABLET, FILM COATED ORAL 2 TIMES DAILY
Status: DISCONTINUED | OUTPATIENT
Start: 2024-02-26 | End: 2024-02-26 | Stop reason: HOSPADM

## 2024-02-25 RX ORDER — POTASSIUM CHLORIDE 600 MG/1
8 CAPSULE, EXTENDED RELEASE ORAL DAILY
Status: DISCONTINUED | OUTPATIENT
Start: 2024-02-26 | End: 2024-02-25 | Stop reason: CLARIF

## 2024-02-25 RX ORDER — ACETAMINOPHEN 325 MG/1
650 TABLET ORAL EVERY 6 HOURS PRN
Status: DISCONTINUED | OUTPATIENT
Start: 2024-02-25 | End: 2024-02-26 | Stop reason: SDUPTHER

## 2024-02-25 RX ORDER — ONDANSETRON HYDROCHLORIDE 2 MG/ML
INJECTION, SOLUTION INTRAVENOUS
Status: COMPLETED
Start: 2024-02-25 | End: 2024-02-25

## 2024-02-25 RX ORDER — TERAZOSIN 1 MG/1
2 CAPSULE ORAL NIGHTLY
Status: DISCONTINUED | OUTPATIENT
Start: 2024-02-25 | End: 2024-02-26 | Stop reason: HOSPADM

## 2024-02-25 RX ADMIN — Medication 25 MG: at 22:23

## 2024-02-25 RX ADMIN — HYDROMORPHONE HYDROCHLORIDE 0.5 MG: 1 INJECTION, SOLUTION INTRAMUSCULAR; INTRAVENOUS; SUBCUTANEOUS at 21:12

## 2024-02-25 RX ADMIN — ONDANSETRON 4 MG: 2 INJECTION INTRAMUSCULAR; INTRAVENOUS at 22:23

## 2024-02-25 RX ADMIN — HYDROMORPHONE HYDROCHLORIDE 1 MG: 1 INJECTION, SOLUTION INTRAMUSCULAR; INTRAVENOUS; SUBCUTANEOUS at 18:12

## 2024-02-25 RX ADMIN — IOHEXOL 100 ML: 350 INJECTION, SOLUTION INTRAVENOUS at 17:32

## 2024-02-25 RX ADMIN — MORPHINE SULFATE 4 MG: 4 INJECTION, SOLUTION INTRAMUSCULAR; INTRAVENOUS at 16:00

## 2024-02-25 RX ADMIN — DIPHENHYDRAMINE HYDROCHLORIDE 25 MG: 25 CAPSULE ORAL at 22:23

## 2024-02-25 RX ADMIN — HYDROMORPHONE HYDROCHLORIDE 0.5 MG: 1 INJECTION, SOLUTION INTRAMUSCULAR; INTRAVENOUS; SUBCUTANEOUS at 22:17

## 2024-02-25 RX ADMIN — ONDANSETRON HYDROCHLORIDE 4 MG: 2 INJECTION, SOLUTION INTRAVENOUS at 22:23

## 2024-02-25 RX ADMIN — ACETAMINOPHEN 650 MG: 325 TABLET ORAL at 17:25

## 2024-02-25 RX ADMIN — LIDOCAINE 2 PATCH: 4 PATCH TOPICAL at 16:00

## 2024-02-25 ASSESSMENT — PAIN DESCRIPTION - LOCATION
LOCATION: BACK
LOCATION: BACK

## 2024-02-25 ASSESSMENT — PAIN SCALES - GENERAL
PAINLEVEL_OUTOF10: 10 - WORST POSSIBLE PAIN
PAINLEVEL_OUTOF10: 7
PAINLEVEL_OUTOF10: 5 - MODERATE PAIN
PAINLEVEL_OUTOF10: 4
PAINLEVEL_OUTOF10: 5 - MODERATE PAIN
PAINLEVEL_OUTOF10: 5 - MODERATE PAIN

## 2024-02-25 ASSESSMENT — PAIN - FUNCTIONAL ASSESSMENT
PAIN_FUNCTIONAL_ASSESSMENT: 0-10
PAIN_FUNCTIONAL_ASSESSMENT: 0-10

## 2024-02-25 ASSESSMENT — PAIN DESCRIPTION - ORIENTATION
ORIENTATION: LEFT
ORIENTATION: LEFT

## 2024-02-25 NOTE — ED TRIAGE NOTES
Pt had a fall today. He was going up bleachers when he missed the hand rail and fell back and down three to four bleachers hitting his back and left elbow.

## 2024-02-25 NOTE — ED PROVIDER NOTES
HPI   Chief Complaint   Patient presents with    Fall       HPI  HPI: This is 73 y.o. male who presents to the ER complaining of back pain, rib pain, and chest pain after a chemical fall that occurred about 3 hours prior to arrival.  Patient was at his [employer] sports game, tripped on a step, missed the handrail, and fell backwards down 3-4 bleacher steps.  He states he landed mostly on the left side.  Has pain throughout the entire left side of the back, the left lateral ribs, the left-sided chest, and the left elbow.  He states the pain is severe and ambulation is difficult due to the pain, though he is able to ambulate unassisted.  He reports feeling short of breath due to the pain, hurts to take a deep breath, and feels like he has trouble catching his breath.  He denies head strike or loss of consciousness.  No headache, no neck pain.  Denies anterior abdominal pain.  No significant lower extremity pain.  No lower extremity numbness, tingling, or weakness.  No saddle anesthesia, no loss of bowel or bladder control, no urinary incontinence or retention.  He has some pain in the left elbow, no swelling, no decreased range of motion.  No numbness, tingling, or weakness of the upper extremities.  No confusion.  Denies amnesia.  No nausea or vomiting.  No cough or hemoptysis.  No fevers or chills.  No dizziness or lightheadedness, no syncope.  No blurry vision or vision loss.  No use of anticoagulant or antiplatelet agents.  No drug or alcohol use.  No medications taken prior to arrival. Of note, patient expressed concern because he recently had a spacer placed in the prostate on 2/13 in preparation to start radiation therapy of prostate cancer and is concerned it may have been displaced, though he is having no issues urinating, and no pain in the groin or genitals. No other complaints or symptoms voiced.     ROS:  General: No decreased responsiveness, confusion, no decreased appetite or fluids, no fever,  chills  Neuro: no headache, lightheadedness or dizziness, no numbness or tingling, no saddle anesthesia, no loss of bowel or bladder  ENMT: No nosebleed, no sore throat  Eyes: No discharge or redness  Skel: No joint pain, no neck pain, +back pain  Cardiac: No chest pain or palpitations  Resp: No shortness of breath, wheezing or cough  GI: No abdominal pain, nausea vomiting or diarrhea  : No dysuria, urgency or frequency, no flank pain  Skin: no rash or wounds  Heme: no bruising, bleeding or petechiae    PMH: BPH, prostate cancer, hypothyroidism, peripheral vascular disease, HTN, prediabetes, OA  Social History: no smoker, no EtOH, no drugs  Family History: Noncontributory        [place] Coma Scale Score: 15                     Patient History   Past Medical History:   Diagnosis Date    Arthritis     BPH (benign prostatic hyperplasia)     Cataract     Colon polyp     Hemorrhoids     HL (hearing loss)     Hypertension     Personal history of other diseases of the circulatory system     History of hypertension    Personal history of other endocrine, nutritional and metabolic disease     History of diabetes mellitus    Prostate cancer (CMS/HCC) 03/2023    Rhinitis     Subluxation     spine     Past Surgical History:   Procedure Laterality Date    COLONOSCOPY      HERNIA REPAIR Right 2004    KNEE ARTHROSCOPY W/ DEBRIDEMENT  12/17/2013    Knee Arthroscopy (Therapeutic)    SHOULDER Left 2017    RCR    TONSILLECTOMY      TOTAL KNEE ARTHROPLASTY Bilateral 2022     Family History   Problem Relation Name Age of Onset    Hypertension Mother Kristal     Bilateral breast cancer Mother Kristal     Colon cancer Mother Kristal     Other (htn) Mother Kristal     Cancer Mother Kristal     Brain Aneurysm Mother Kristal     Parkinsonism Father      Parkinsonism Brother       Social History     Tobacco Use    Smoking status: Former     Packs/day: 0.25     Years: 5.00     Additional pack years: 0.00     Total pack years:  1.25     Types: Cigarettes, Cigars     Quit date: 2013     Years since quittin.1    Smokeless tobacco: Never   Vaping Use    Vaping Use: Never used   Substance Use Topics    Alcohol use: Yes     Alcohol/week: 1.0 standard drink of alcohol     Types: 1 Standard drinks or equivalent per week     Comment: Social drinker    Drug use: Never       Physical Exam   ED Triage Vitals [24 1423]   Temperature Heart Rate Respirations BP   36.9 °C (98.4 °F) 88 16 171/82      Pulse Ox Temp Source Heart Rate Source Patient Position   99 % Temporal -- --      BP Location FiO2 (%)     -- --       Physical Exam  General: Vital signs stable, Pt is alert, no acute distress  Eyes: Conjunctiva normal, PERRL, EOMs intact  HENMT: Normocephalic, atraumatic, external ears and nose normal, no scars or masses. Moist mucous membranes. Trachea is midline. No meningeal signs, moves neck freely.  Resp: Respiratory effort is normal, no retractions, no stridor.  Lungs CTAB, no wheezes or rhonchi. +tenderness over the left lateral chest wall, no step offs or crepitus.   CV: Heart is regular rate and rhythm. No peripheral edema  GI: Abdomen is soft nontender to palpation no guarding or rigidity.  Skin: No evidence of trauma, skin is warm and dry. No rashes, lesions or ulcers.  Skel: full range of motion of upper and lower extremities. No point midline tenderness over the cervical thoracic or lumbar spine.  Reproducible tenderness over the left thoracic and lumbar paraspinal musculature and into the left posterior and lateral ribs. 5/5 strength throughout BLE, no motor deficits. Sensation intact and equal throughout trunk and BLE. No motor or sensory deficit. There is no tenderness or edema throughout the bilateral lower extremities, no erythema or warmth. Mild tenderness over the left elbow, no edema, no decreased ROM. Nontender throughout the remainder of the LUE and the entire RUE, BUE are NVI, 2+ radial pulses, cap refill <2 sec,  sensation intact and equal throughout the BUE.  Neuro: Normal gait, CN II-XII intact, no motor or sensory changes  Psych: Alert and oriented ×3, judgment is appropriate, normal mood and affect   ED Course & MDM   ED Course as of 02/25/24 1849   Sun Feb 25, 2024   1724 14:59 12 lead EKG interpreted by myself and my ED attending reveals sinus rhythm with a rate of 83 beats per minute.  Normal Axis.  There are no ST elevations. T wave inversions in V1, III. Q waves in III aVF similar to prior EKGs. No acute ischemic changes identified.  [EH]      ED Course User Index  [EH] Zainab Merritt PA-C       Medical Decision Making  ED course / MDM     Summary:  Patient presented with left sided back and chest wall pain after a fall, mechanical fall, fell backwards down 4 bleacher steps.  Landed mostly in the left side.  Vital signs stable, hypertensive in triage at 171/82, patient appears nontoxic but uncomfortable.  There is tenderness over the left thoracic and lumbar paraspinal musculature and back, and the left posterior and lateral ribs.  No step-offs or crepitus.  No bruising.  Has some left elbow pain, no edema, no decreased range of motion.  Nontender over the anterior abdomen.  No neck pain, no C-spine tenderness, no head pain.  No pain in the lower extremities, no numbness or tingling.  No neurologic deficits.  IV established labs drawn.  Labs show a leukocytosis of 17.9, normal hemoglobin, minor electrolyte abnormalities, creatinine 1.26 and GFR 60 which is slightly increased from baseline, normal liver enzymes.  EKG is nonischemic and troponin is negative.  Chest pain likely related to the fall.  Initial x-rays of the left elbow and the chest show no acute traumatic abnormalities.  CT scans of the head, spine, and chest abdomen pelvis ordered to evaluate injury.  Patient was given morphine and lidocaine patch, which improved pain for period of time, pain returned, and he was then given a dose of Dilaudid and  Tylenol, which did improve his symptoms.    Patient case signed out to ED attending Dr. Valentine at 1900 due to shift change, pending CT results, reevaluation, and final disposition.      This note has been transcribed using voice recognition and may contain grammatical errors, misplaced words, incorrect words, incorrect phrases or other errors.   Procedure  Procedures     Zainab Merritt PA-C  02/25/24 [id number]

## 2024-02-26 ENCOUNTER — APPOINTMENT (OUTPATIENT)
Dept: CARDIOLOGY | Facility: HOSPITAL | Age: 74
End: 2024-02-26
Payer: MEDICARE

## 2024-02-26 ENCOUNTER — APPOINTMENT (OUTPATIENT)
Dept: RADIOLOGY | Facility: HOSPITAL | Age: 74
End: 2024-02-26
Payer: MEDICARE

## 2024-02-26 VITALS
SYSTOLIC BLOOD PRESSURE: 140 MMHG | RESPIRATION RATE: 18 BRPM | HEART RATE: 81 BPM | DIASTOLIC BLOOD PRESSURE: 82 MMHG | OXYGEN SATURATION: 95 % | TEMPERATURE: 99.5 F | HEIGHT: 72 IN | BODY MASS INDEX: 27.09 KG/M2 | WEIGHT: 200 LBS

## 2024-02-26 LAB
ANION GAP SERPL CALC-SCNC: 13 MMOL/L (ref 10–20)
BUN SERPL-MCNC: 16 MG/DL (ref 6–23)
CALCIUM SERPL-MCNC: 8.5 MG/DL (ref 8.6–10.3)
CHLORIDE SERPL-SCNC: 101 MMOL/L (ref 98–107)
CO2 SERPL-SCNC: 28 MMOL/L (ref 21–32)
CREAT SERPL-MCNC: 1.14 MG/DL (ref 0.5–1.3)
EGFRCR SERPLBLD CKD-EPI 2021: 68 ML/MIN/1.73M*2
ERYTHROCYTE [DISTWIDTH] IN BLOOD BY AUTOMATED COUNT: 12.6 % (ref 11.5–14.5)
GLUCOSE BLD MANUAL STRIP-MCNC: 142 MG/DL (ref 74–99)
GLUCOSE SERPL-MCNC: 110 MG/DL (ref 74–99)
HCT VFR BLD AUTO: 44.3 % (ref 41–52)
HGB BLD-MCNC: 15.2 G/DL (ref 13.5–17.5)
MCH RBC QN AUTO: 32 PG (ref 26–34)
MCHC RBC AUTO-ENTMCNC: 34.3 G/DL (ref 32–36)
MCV RBC AUTO: 93 FL (ref 80–100)
NRBC BLD-RTO: 0 /100 WBCS (ref 0–0)
PLATELET # BLD AUTO: 197 X10*3/UL (ref 150–450)
POTASSIUM SERPL-SCNC: 3.5 MMOL/L (ref 3.5–5.3)
RBC # BLD AUTO: 4.75 X10*6/UL (ref 4.5–5.9)
SODIUM SERPL-SCNC: 138 MMOL/L (ref 136–145)
WBC # BLD AUTO: 11.7 X10*3/UL (ref 4.4–11.3)

## 2024-02-26 PROCEDURE — 93005 ELECTROCARDIOGRAM TRACING: CPT

## 2024-02-26 PROCEDURE — 82947 ASSAY GLUCOSE BLOOD QUANT: CPT | Mod: 59

## 2024-02-26 PROCEDURE — 80048 BASIC METABOLIC PNL TOTAL CA: CPT | Performed by: FAMILY MEDICINE

## 2024-02-26 PROCEDURE — G0378 HOSPITAL OBSERVATION PER HR: HCPCS

## 2024-02-26 PROCEDURE — 71046 X-RAY EXAM CHEST 2 VIEWS: CPT | Performed by: STUDENT IN AN ORGANIZED HEALTH CARE EDUCATION/TRAINING PROGRAM

## 2024-02-26 PROCEDURE — 93005 ELECTROCARDIOGRAM TRACING: CPT | Mod: 59

## 2024-02-26 PROCEDURE — 71046 X-RAY EXAM CHEST 2 VIEWS: CPT

## 2024-02-26 PROCEDURE — 2500000001 HC RX 250 WO HCPCS SELF ADMINISTERED DRUGS (ALT 637 FOR MEDICARE OP): Performed by: FAMILY MEDICINE

## 2024-02-26 PROCEDURE — 96376 TX/PRO/DX INJ SAME DRUG ADON: CPT

## 2024-02-26 PROCEDURE — 2500000004 HC RX 250 GENERAL PHARMACY W/ HCPCS (ALT 636 FOR OP/ED): Performed by: FAMILY MEDICINE

## 2024-02-26 PROCEDURE — 97530 THERAPEUTIC ACTIVITIES: CPT | Mod: GP

## 2024-02-26 PROCEDURE — 2500000004 HC RX 250 GENERAL PHARMACY W/ HCPCS (ALT 636 FOR OP/ED): Performed by: NURSE PRACTITIONER

## 2024-02-26 PROCEDURE — 85027 COMPLETE CBC AUTOMATED: CPT | Performed by: FAMILY MEDICINE

## 2024-02-26 PROCEDURE — 97161 PT EVAL LOW COMPLEX 20 MIN: CPT | Mod: GP

## 2024-02-26 PROCEDURE — 2500000001 HC RX 250 WO HCPCS SELF ADMINISTERED DRUGS (ALT 637 FOR MEDICARE OP): Performed by: NURSE PRACTITIONER

## 2024-02-26 RX ORDER — OXYCODONE AND ACETAMINOPHEN 5; 325 MG/1; MG/1
1 TABLET ORAL EVERY 6 HOURS PRN
Qty: 12 TABLET | Refills: 0 | Status: SHIPPED | OUTPATIENT
Start: 2024-02-26 | End: 2024-02-29

## 2024-02-26 RX ORDER — DOCUSATE SODIUM 100 MG/1
100 CAPSULE, LIQUID FILLED ORAL 2 TIMES DAILY
Status: DISCONTINUED | OUTPATIENT
Start: 2024-02-26 | End: 2024-02-26 | Stop reason: HOSPADM

## 2024-02-26 RX ORDER — CLONIDINE 0.3 MG/24H
1 PATCH, EXTENDED RELEASE TRANSDERMAL
Start: 2024-02-26 | End: 2024-05-02 | Stop reason: SDUPTHER

## 2024-02-26 RX ORDER — NALOXONE HYDROCHLORIDE 4 MG/.1ML
4 SPRAY NASAL AS NEEDED
Qty: 2 EACH | Refills: 11 | Status: SHIPPED | OUTPATIENT
Start: 2024-02-26 | End: 2024-05-21 | Stop reason: ALTCHOICE

## 2024-02-26 RX ORDER — TRAMADOL HYDROCHLORIDE 50 MG/1
50 TABLET ORAL EVERY 8 HOURS PRN
Status: DISCONTINUED | OUTPATIENT
Start: 2024-02-26 | End: 2024-02-26 | Stop reason: HOSPADM

## 2024-02-26 RX ORDER — CYCLOBENZAPRINE HCL 5 MG
5 TABLET ORAL 3 TIMES DAILY PRN
Qty: 30 TABLET | Refills: 0 | Status: SHIPPED | OUTPATIENT
Start: 2024-02-26 | End: 2024-05-21 | Stop reason: ALTCHOICE

## 2024-02-26 RX ADMIN — HYDROMORPHONE HYDROCHLORIDE 0.6 MG: 1 INJECTION, SOLUTION INTRAMUSCULAR; INTRAVENOUS; SUBCUTANEOUS at 09:22

## 2024-02-26 RX ADMIN — HYDROMORPHONE HYDROCHLORIDE 0.6 MG: 1 INJECTION, SOLUTION INTRAMUSCULAR; INTRAVENOUS; SUBCUTANEOUS at 13:23

## 2024-02-26 RX ADMIN — TRAMADOL HYDROCHLORIDE 50 MG: 50 TABLET, COATED ORAL at 10:50

## 2024-02-26 RX ADMIN — HYDRALAZINE HYDROCHLORIDE 50 MG: 50 TABLET ORAL at 00:27

## 2024-02-26 RX ADMIN — DOCUSATE SODIUM 100 MG: 100 CAPSULE, LIQUID FILLED ORAL at 10:48

## 2024-02-26 RX ADMIN — Medication 3 MG: at 00:27

## 2024-02-26 RX ADMIN — HYDROMORPHONE HYDROCHLORIDE 0.6 MG: 1 INJECTION, SOLUTION INTRAMUSCULAR; INTRAVENOUS; SUBCUTANEOUS at 04:09

## 2024-02-26 RX ADMIN — HYDROMORPHONE HYDROCHLORIDE 0.6 MG: 1 INJECTION, SOLUTION INTRAMUSCULAR; INTRAVENOUS; SUBCUTANEOUS at 00:13

## 2024-02-26 RX ADMIN — SODIUM CHLORIDE, POTASSIUM CHLORIDE, SODIUM LACTATE AND CALCIUM CHLORIDE 100 ML/HR: 600; 310; 30; 20 INJECTION, SOLUTION INTRAVENOUS at 00:32

## 2024-02-26 SDOH — SOCIAL STABILITY: SOCIAL INSECURITY: HAVE YOU HAD THOUGHTS OF HARMING ANYONE ELSE?: YES

## 2024-02-26 SDOH — SOCIAL STABILITY: SOCIAL INSECURITY: DO YOU FEEL UNSAFE GOING BACK TO THE PLACE WHERE YOU ARE LIVING?: NO

## 2024-02-26 SDOH — SOCIAL STABILITY: SOCIAL INSECURITY: WERE YOU ABLE TO COMPLETE ALL THE BEHAVIORAL HEALTH SCREENINGS?: YES

## 2024-02-26 SDOH — SOCIAL STABILITY: SOCIAL INSECURITY: DO YOU FEEL ANYONE HAS EXPLOITED OR TAKEN ADVANTAGE OF YOU FINANCIALLY OR OF YOUR PERSONAL PROPERTY?: NO

## 2024-02-26 SDOH — SOCIAL STABILITY: SOCIAL INSECURITY: ARE YOU OR HAVE YOU BEEN THREATENED OR ABUSED PHYSICALLY, EMOTIONALLY, OR SEXUALLY BY ANYONE?: NO

## 2024-02-26 SDOH — SOCIAL STABILITY: SOCIAL INSECURITY: ABUSE: ADULT

## 2024-02-26 SDOH — SOCIAL STABILITY: SOCIAL INSECURITY: DOES ANYONE TRY TO KEEP YOU FROM HAVING/CONTACTING OTHER FRIENDS OR DOING THINGS OUTSIDE YOUR HOME?: NO

## 2024-02-26 SDOH — SOCIAL STABILITY: SOCIAL INSECURITY: ARE THERE ANY APPARENT SIGNS OF INJURIES/BEHAVIORS THAT COULD BE RELATED TO ABUSE/NEGLECT?: NO

## 2024-02-26 SDOH — SOCIAL STABILITY: SOCIAL INSECURITY: HAS ANYONE EVER THREATENED TO HURT YOUR FAMILY OR YOUR PETS?: NO

## 2024-02-26 ASSESSMENT — COGNITIVE AND FUNCTIONAL STATUS - GENERAL
PATIENT BASELINE BEDBOUND: NO
CLIMB 3 TO 5 STEPS WITH RAILING: A LITTLE
PERSONAL GROOMING: A LITTLE
HELP NEEDED FOR BATHING: A LITTLE
MOVING FROM LYING ON BACK TO SITTING ON SIDE OF FLAT BED WITH BEDRAILS: A LITTLE
DRESSING REGULAR UPPER BODY CLOTHING: A LITTLE
WALKING IN HOSPITAL ROOM: A LITTLE
DRESSING REGULAR LOWER BODY CLOTHING: A LITTLE
PERSONAL GROOMING: A LITTLE
MOBILITY SCORE: 19
CLIMB 3 TO 5 STEPS WITH RAILING: A LITTLE
DRESSING REGULAR LOWER BODY CLOTHING: A LITTLE
TURNING FROM BACK TO SIDE WHILE IN FLAT BAD: A LITTLE
MOBILITY SCORE: 18
MOVING TO AND FROM BED TO CHAIR: A LITTLE
WALKING IN HOSPITAL ROOM: A LITTLE
DAILY ACTIVITIY SCORE: 18
TURNING FROM BACK TO SIDE WHILE IN FLAT BAD: A LITTLE
TOILETING: A LITTLE
STANDING UP FROM CHAIR USING ARMS: A LITTLE
EATING MEALS: A LITTLE
DRESSING REGULAR UPPER BODY CLOTHING: A LITTLE
MOVING TO AND FROM BED TO CHAIR: A LITTLE
MOBILITY SCORE: 19
CLIMB 3 TO 5 STEPS WITH RAILING: A LITTLE
WALKING IN HOSPITAL ROOM: A LITTLE
STANDING UP FROM CHAIR USING ARMS: A LITTLE
HELP NEEDED FOR BATHING: A LITTLE
MOVING TO AND FROM BED TO CHAIR: A LITTLE
DAILY ACTIVITIY SCORE: 20
STANDING UP FROM CHAIR USING ARMS: A LITTLE
TURNING FROM BACK TO SIDE WHILE IN FLAT BAD: A LITTLE

## 2024-02-26 ASSESSMENT — PAIN SCALES - GENERAL
PAINLEVEL_OUTOF10: 7
PAINLEVEL_OUTOF10: 10 - WORST POSSIBLE PAIN
PAINLEVEL_OUTOF10: 10 - WORST POSSIBLE PAIN
PAINLEVEL_OUTOF10: 7
PAINLEVEL_OUTOF10: 3
PAINLEVEL_OUTOF10: 5 - MODERATE PAIN
PAINLEVEL_OUTOF10: 5 - MODERATE PAIN
PAINLEVEL_OUTOF10: 6
PAINLEVEL_OUTOF10: 7

## 2024-02-26 ASSESSMENT — LIFESTYLE VARIABLES
HOW OFTEN DO YOU HAVE A DRINK CONTAINING ALCOHOL: NEVER
AUDIT-C TOTAL SCORE: 0
HOW MANY STANDARD DRINKS CONTAINING ALCOHOL DO YOU HAVE ON A TYPICAL DAY: PATIENT DOES NOT DRINK
HOW OFTEN DO YOU HAVE 6 OR MORE DRINKS ON ONE OCCASION: NEVER
SKIP TO QUESTIONS 9-10: 1
AUDIT-C TOTAL SCORE: 0

## 2024-02-26 ASSESSMENT — ACTIVITIES OF DAILY LIVING (ADL)
FEEDING YOURSELF: NEEDS ASSISTANCE
GROOMING: NEEDS ASSISTANCE
DRESSING YOURSELF: NEEDS ASSISTANCE
HEARING - LEFT EAR: FUNCTIONAL
BATHING: NEEDS ASSISTANCE
ADEQUATE_TO_COMPLETE_ADL: YES
ADL_ASSISTANCE: INDEPENDENT
HEARING - RIGHT EAR: FUNCTIONAL
LACK_OF_TRANSPORTATION: PATIENT DECLINED
TOILETING: NEEDS ASSISTANCE
JUDGMENT_ADEQUATE_SAFELY_COMPLETE_DAILY_ACTIVITIES: YES
WALKS IN HOME: NEEDS ASSISTANCE
PATIENT'S MEMORY ADEQUATE TO SAFELY COMPLETE DAILY ACTIVITIES?: YES
LACK_OF_TRANSPORTATION: NO

## 2024-02-26 ASSESSMENT — PAIN - FUNCTIONAL ASSESSMENT
PAIN_FUNCTIONAL_ASSESSMENT: 0-10

## 2024-02-26 ASSESSMENT — PAIN DESCRIPTION - ORIENTATION
ORIENTATION: LEFT
ORIENTATION: LEFT;UPPER;MID;LOWER
ORIENTATION: LEFT;MID;UPPER;LOWER
ORIENTATION: LEFT;UPPER;MID;LOWER

## 2024-02-26 ASSESSMENT — PAIN DESCRIPTION - LOCATION
LOCATION: OTHER (COMMENT)
LOCATION: CHEST
LOCATION: OTHER (COMMENT)

## 2024-02-26 ASSESSMENT — PAIN DESCRIPTION - DESCRIPTORS
DESCRIPTORS: SHARP
DESCRIPTORS: SHARP

## 2024-02-26 ASSESSMENT — PATIENT HEALTH QUESTIONNAIRE - PHQ9
SUM OF ALL RESPONSES TO PHQ9 QUESTIONS 1 & 2: 0
1. LITTLE INTEREST OR PLEASURE IN DOING THINGS: NOT AT ALL
2. FEELING DOWN, DEPRESSED OR HOPELESS: NOT AT ALL

## 2024-02-26 NOTE — CARE PLAN
Problem: Pain  Goal: Takes deep breaths with improved pain control throughout the shift  Outcome: Progressing  Goal: Turns in bed with improved pain control throughout the shift  Outcome: Progressing  Goal: Walks with improved pain control throughout the shift  Outcome: Progressing  Goal: Performs ADL's with improved pain control throughout shift  Outcome: Progressing  Goal: Participates in PT with improved pain control throughout the shift  Outcome: Progressing  Goal: Free from opioid side effects throughout the shift  Outcome: Progressing  Goal: Free from acute confusion related to pain meds throughout the shift  Outcome: Progressing   The patient's goals for the shift include      The clinical goals for the shift include Pt will maintain safety, free from falls and injuries throughout this shift.

## 2024-02-26 NOTE — PROGRESS NOTES
Transfer of care note:    I received this patient in signout -   ED Course as of 02/27/24 1520   Sun Feb 25, 2024   1653 14:59 12 lead EKG interpreted by myself and my ED attending reveals sinus rhythm with a rate of 83 beats per minute.  Normal Axis.  There are no ST elevations. T wave inversions in V1, III. Q waves in III aVF similar to prior EKGs. No acute ischemic changes identified.  []   1941 Incentive spirometry 1750cc [JM]   2002 Discussed with Dr. [place] from trauma.  Recommends admitting here as patient would not be a candidate for block given how well pulling on I-S.  He does not see a pneumothorax see some subcutaneous air.  Plan for admission for pain control.  If medicine not comfortable with admitting here, he is happy to admit the patient.  Patient prefers staying here at [facility]. [JM]      ED Course User Index  [EH] Zainab Merritt PA-C  [] Onel Valentine MD         Diagnoses as of 02/27/24 1520   Closed fracture of multiple ribs of left side, initial encounter   Fall, initial encounter     Given level of pain, suspected may require multiple day admission for pain control and monitoring of pulmonary function.  Plan for inpatient admission.  Suspect leukocytosis is trauma related.  Patient was asymptomatic prior to the fall.  Suspect is reactive to trauma.  Lower suspicion for underlying infectious process at this time.  Patient signed out to Dr. Lagos for continued management.

## 2024-02-26 NOTE — CARE PLAN
2350: Pt admits to this unit on stretcher from ED, accompanied with wife, with no distress observed. Assessments completed, and education provided regarding plan of care, in which, both, wife and pt verbalized understanding. Pt medicated for pain while completing questionnaire for comfort and clarity. Orientation provided regarding location of restroom, use of call light, and importance of calling nurse prior to exiting the bed alone.     Pt refuses SCD's, and some meds, stating that he will take them tomorrow. Physician will be aware. Bed locked and lowered. Call light placed within reach. Safety maintained.     The clinical goals for the shift include maintaining safety throughout this shift, while managing pain.       Problem: Pain  Goal: Takes deep breaths with improved pain control throughout the shift  Outcome: Progressing  Goal: Turns in bed with improved pain control throughout the shift  Outcome: Progressing  Goal: Walks with improved pain control throughout the shift  Outcome: Progressing  Goal: Performs ADL's with improved pain control throughout shift  Outcome: Progressing  Goal: Participates in PT with improved pain control throughout the shift  Outcome: Progressing  Goal: Free from opioid side effects throughout the shift  Outcome: Progressing  Goal: Free from acute confusion related to pain meds throughout the shift  Outcome: Progressing

## 2024-02-26 NOTE — PROGRESS NOTES
Patient has no further pt/ot needs at this time.  Patient plans to return home upon discharge.  Will continue to follow for discharge planning needs.

## 2024-02-26 NOTE — PROGRESS NOTES
Physical Therapy    Physical Therapy Evaluation & Treatment    Patient Name: Jack Henry  MRN: [id number]  Today's Date: 2/26/2024   Time Calculation  Start Time: 0848  Stop Time: 0913  Time Calculation (min): 25 min    Assessment/Plan   PT Assessment  PT Assessment Results: Pain  Evaluation/Treatment Tolerance: Patient tolerated treatment well  Medical Staff Made Aware: Yes  Strengths: Ability to acquire knowledge, Access to adaptive/assistive products  Barriers to Participation:  (none)  End of Session Communication: Bedside nurse  Assessment Comment: PT eval completed, and pt is mod indep/SBA for all transfers and mobility with ww. He has no further PT needs, and also declines OT eval. Notified OT.  End of Session Patient Position: Bed, 2 rail up, Alarm off, not on at start of session   IP OR SWING BED PT PLAN  Inpatient or Swing Bed: Inpatient  PT Plan  PT Plan: PT Eval only  PT Eval Only Reason: Safe to return home  PT Frequency: PT eval only  PT Discharge Recommendations: No PT needed after discharge  PT Recommended Transfer Status: Independent  PT - OK to Discharge: Yes      Subjective     General Visit Information:  General  Reason for Referral: Pt came to ED after he fell backwards on the bleachers. L elbow and CTLS negative for x, + 5th-8th posterior L rib fx.  Past Medical History Relevant to Rehab: BPH, prostate CA, PVD, HTN, OA, L RCR, B TKR  Family/Caregiver Present: Yes  Caregiver Feedback: Spouse present and supportive  Prior to Session Communication: Bedside nurse  Patient Position Received: Bed, 2 rail up, Alarm off, not on at start of session  Preferred Learning Style: auditory, kinesthetic  General Comment: Pt is pleasant and cooperative, eager to go home. He has questions regarding his radiation therapy that he is supposed to start on Thursday. Notified nurse that pt has questions for the doctor.  Home Living:  Home Living  Type of Home: House  Lives With: Spouse  Home Adaptive Equipment:  Walker rolling or standard, Cane  Home Layout: Two level, 1/2 bath on main level, Stairs to alternate level with rails (Able to stay on 1st floor in recliner if needed.)  Alternate Level Stairs-Rails: Right  Alternate Level Stairs-Number of Steps: 12  Home Access: Stairs to enter with rails  Entrance Stairs-Rails: Both  Entrance Stairs-Number of Steps: 3  Prior Level of Function:  Prior Function Per Pt/Caregiver Report  Level of [place]: Independent with ADLs and functional transfers, Independent with homemaking with ambulation  ADL Assistance: Independent  Homemaking Assistance: Independent  Ambulatory Assistance: Independent    Objective   Pain:  Pain Assessment  Pain Assessment: 0-10  Pain Score: 3  Pain Type: Acute pain  Pain Location: Rib cage  Pain Orientation: Left, Posterior  Cognition:  Cognition  Overall Cognitive Status: Within Functional Limits  Attention: Within Functional Limits  Memory: Within Funtional Limits  Problem Solving: Within Functional Limits  Safety/Judgement: Within Functional Limits  Insight: Within function limits  Impulsive: Within functional limits    General Assessments:  General Observation  General Observation: IV     Activity Tolerance  Endurance: Endurance does not limit participation in activity    Sensation  Light Touch: No apparent deficits    Strength  Strength Comments: WFL, observed functionally  Strength  Strength Comments: WFL, observed functionally    Static Sitting Balance  Static Sitting-Balance Support: Feet supported, Bilateral upper extremity supported  Static Sitting-Level of Assistance: Independent  Static Sitting-Comment/Number of Minutes: 5    Static Standing Balance  Static Standing-Balance Support: No upper extremity supported  Static Standing-Level of Assistance: Independent  Dynamic Standing Balance  Dynamic Standing-Balance Support: Bilateral upper extremity supported  Dynamic Standing-Balance: Turning  Dynamic Standing-Comments: mod indep  Functional  Assessments:  Bed Mobility  Bed Mobility: Yes  Bed Mobility 1  Bed Mobility 1: Log roll, Side lying right to sit  Level of Assistance 1: Distant supervision, Minimal verbal cues, Minimal tactile cues  Bed Mobility Comments 1: Pt educated in bed mobility technique moving supine->sit via logroll technique; pt requires minimal VC's for technique and proper UE placements for upper body initiate rolling and to use L UE to reach over for opposite bedrail, and to use BUE to push up into sitting from sidelying.    Transfers  Transfer: Yes  Transfer 1  Technique 1: Sit to stand  Transfer Device 1: Walker  Transfer Level of Assistance 1: Distant supervision, Minimal verbal cues  Trials/Comments 1: Pt provided instruction in safe sit<->stand technique to enable them to move in/out of bed/chair safely; pt required min tactile and verbal cues for proper hand placements and to scoot to edge of sitting surface to facilitate ease of sit->stand.    Treatments:  Bed Mobility  Bed Mobility 2  Bed Mobility  2: Log roll, Sitting to supine  Level of Assistance 2: Minimum assistance, Minimal verbal cues, Minimal tactile cues  Bed Mobility Comments 2: Reversal of OOB technique and logroll relayed to pt. Assist needed with BLE.    Ambulation/Gait Training  Ambulation/Gait Training Performed: Yes  Ambulation/Gait Training 1  Surface 1: Level tile  Device 1: Rolling walker  Assistance 1: Modified independent  Quality of Gait 1:  (step-through gait pattern, no LOB, decreased gait speed)  Comments/Distance (ft) 1: 125' x 2  Transfers  Transfers 2  Technique 2: Stand to sit  Transfer Device 2: Walker  Transfer Level of Assistance 2: Distant supervision, Minimal verbal cues  Trials/Comments 2: Cues to line up to and reach back for sitting surface before sitting.    Stairs  Stairs: Yes  Stairs  Rails 1: Bilateral  Device 1: Railing  Assistance 1: Modified independent  Comment/Number of Steps 1: 3 (non-reciprocally)  Outcome Measures:  [facility] Basic  Mobility  Turning from your back to your side while in a flat bed without using bedrails: None  Moving from lying on your back to sitting on the side of a flat bed without using bedrails: A little  Moving to and from bed to chair (including a wheelchair): A little  Standing up from a chair using your arms (e.g. wheelchair or bedside chair): A little  To walk in hospital room: A little  Climbing 3-5 steps with railing: A little  Basic Mobility - Total Score: 19        Education Documentation  Precautions, taught by Dali Stout, PT at 2/26/2024 11:06 AM.  Learner: Significant Other, Patient  Readiness: Acceptance  Method: Explanation  Response: Verbalizes Understanding, Demonstrated Understanding    Body Mechanics, taught by Dali Stout, PT at 2/26/2024 11:06 AM.  Learner: Significant Other, Patient  Readiness: Acceptance  Method: Explanation  Response: Verbalizes Understanding, Demonstrated Understanding    Mobility Training, taught by Dali Stout, PT at 2/26/2024 11:06 AM.  Learner: Significant Other, Patient  Readiness: Acceptance  Method: Explanation  Response: Verbalizes Understanding, Demonstrated Understanding    Education Comments  No comments found.

## 2024-02-26 NOTE — H&P
History Of Present Illness  Jack Henry is a 73 y.o. male presenting with complaint of left back and flank pain after tripping over a bleacher seat day  prior. Pt tells me prior to was in usual state of health. Pt denies chest pain / tightness / heaviness / pressure / radiating pain / palpitations / irregular heartbeats / lightheadedness / cough / congestion / SOB or CRESPO / orthopnea or paroxysmal nocturnal dyspnea / near syncope / weight loss or gain.. That he was at his [employer] sports game, tripped on a step, missed the handrail, and fell backwards down 3-4 bleacher steps. He states he landed mostly on the left side. Has pain throughout the entire left side of the back, the left lateral ribs, the left-sided chest, and the left elbow. He states the pain is severe and ambulation is difficult due to the pain . He came to ER wherein work up noted for Labs show a leukocytosis of 17.9, normal hemoglobin, minor electrolyte abnormalities, creatinine 1.26 and GFR 60 which is slightly increased from baseline, normal liver enzymes. EKG is nonischemic and troponin is negative. Did go for imaging noted for left 5th-8th rib fracture, some comment of some subcutaneous air seen on CT. ER discussed with trauma, per chart, and pt stable for admission for continued monitoring. Pt remains on room air. Tells me pain is improving.      Past Medical History  He has a past medical history of Arthritis, BPH (benign prostatic hyperplasia), Cataract, Colon polyp, Hemorrhoids, HL (hearing loss), Hypertension, Personal history of other diseases of the circulatory system, Personal history of other endocrine, nutritional and metabolic disease, Prostate cancer (CMS/HCC) (03/2023), Rhinitis, and Subluxation.    Surgical History  He has a past surgical history that includes Knee arthroscopy w/ debridement (12/17/2013); Total knee arthroplasty (Bilateral, 2022); XR shoulder (Left, 2017); Tonsillectomy; Hernia repair (Right, 2004); and  Colonoscopy.     Social History  He reports that he quit smoking about 11 years ago. His smoking use included cigarettes and cigars. He has a 1.25 pack-year smoking history. He has never used smokeless tobacco. He reports current alcohol use of about 1.0 standard drink of alcohol per week. He reports that he does not use drugs.    Family History  Family History   Problem Relation Name Age of Onset    Hypertension Mother Kristal     Bilateral breast cancer Mother Kristal     Colon cancer Mother Kristal     Other (htn) Mother Kristal     Cancer Mother Kristal     Brain Aneurysm Mother Kristal     Parkinsonism Father      Parkinsonism Brother          Allergies  Patient has no known allergies.    Review of Systems    ROS:  General: No decreased responsiveness, confusion, no decreased appetite or fluids, no fever, chills  Neuro: no headache, lightheadedness or dizziness, no numbness or tingling, no saddle anesthesia, no loss of bowel or bladder  ENMT: No nosebleed, no sore throat  Eyes: No discharge or redness  Skel: No joint pain, no neck pain, +back pain  Cardiac: No chest pain or palpitations  Resp: No shortness of breath, wheezing or cough  GI: No abdominal pain, nausea vomiting or diarrhea  : No dysuria, urgency or frequency, no flank pain  Skin: no rash or wounds  Heme: no bruising, bleeding or petechiae     Physical Exam  Constitutional:       Appearance: Normal appearance. He is normal weight.   HENT:      Head: Normocephalic.   Eyes:      Pupils: Pupils are equal, round, and reactive to light.   Cardiovascular:      Rate and Rhythm: Normal rate.   Pulmonary:      Effort: Pulmonary effort is normal.   Abdominal:      General: Bowel sounds are normal.      Palpations: Abdomen is soft.   Musculoskeletal:         General: Tenderness present.      Cervical back: Normal range of motion.      Comments: To left flank / rib area    Skin:     Capillary Refill: Capillary refill takes less than 2 seconds.    Neurological:      General: No focal deficit present.      Mental Status: He is alert and oriented to person, place, and time.   Psychiatric:         Mood and Affect: Mood normal.          Last Recorded Vitals  /79 (BP Location: Left arm, Patient Position: Sitting)   Pulse 91   Temp 36.7 °C (98.1 °F) (Temporal)   Resp 18   Wt 90.7 kg (200 lb)   SpO2 93%     Relevant Results        Results for orders placed or performed during the hospital encounter of 02/25/24 (from the past 24 hour(s))   ECG 12 lead   Result Value Ref Range    Ventricular Rate 83 BPM    Atrial Rate 83 BPM    UT Interval 152 ms    QRS Duration 86 ms    QT Interval 372 ms    QTC Calculation(Bazett) 437 ms    P Axis 41 degrees    R Axis 24 degrees    T Axis -6 degrees    QRS Count 14 beats    Q Onset 215 ms    P Onset 139 ms    P Offset 188 ms    T Offset 401 ms    QTC Fredericia 414 ms   CBC with Differential   Result Value Ref Range    WBC 17.9 (H) 4.4 - 11.3 x10*3/uL    nRBC 0.0 0.0 - 0.0 /100 WBCs    RBC 5.30 4.50 - 5.90 x10*6/uL    Hemoglobin 16.5 13.5 - 17.5 g/dL    Hematocrit 49.2 41.0 - 52.0 %    MCV 93 80 - 100 fL    MCH 31.1 26.0 - 34.0 pg    MCHC 33.5 32.0 - 36.0 g/dL    RDW 12.4 11.5 - 14.5 %    Platelets 199 150 - 450 x10*3/uL    Neutrophils % 87.6 40.0 - 80.0 %    Immature Granulocytes %, Automated 0.9 0.0 - 0.9 %    Lymphocytes % 4.8 13.0 - 44.0 %    Monocytes % 6.1 2.0 - 10.0 %    Eosinophils % 0.3 0.0 - 6.0 %    Basophils % 0.3 0.0 - 2.0 %    Neutrophils Absolute 15.64 (H) 1.60 - 5.50 x10*3/uL    Immature Granulocytes Absolute, Automated 0.16 0.00 - 0.50 x10*3/uL    Lymphocytes Absolute 0.85 0.80 - 3.00 x10*3/uL    Monocytes Absolute 1.09 (H) 0.05 - 0.80 x10*3/uL    Eosinophils Absolute 0.05 0.00 - 0.40 x10*3/uL    Basophils Absolute 0.06 0.00 - 0.10 x10*3/uL   Comprehensive Metabolic Panel   Result Value Ref Range    Glucose 108 (H) 74 - 99 mg/dL    Sodium 138 136 - 145 mmol/L    Potassium 4.2 3.5 - 5.3 mmol/L     Chloride 103 98 - 107 mmol/L    Bicarbonate 26 21 - 32 mmol/L    Anion Gap 13 10 - 20 mmol/L    Urea Nitrogen 23 6 - 23 mg/dL    Creatinine 1.26 0.50 - 1.30 mg/dL    eGFR 60 (L) >60 mL/min/1.73m*2    Calcium 9.5 8.6 - 10.3 mg/dL    Albumin 4.3 3.4 - 5.0 g/dL    Alkaline Phosphatase 68 33 - 136 U/L    Total Protein 6.9 6.4 - 8.2 g/dL    AST 28 9 - 39 U/L    Bilirubin, Total 0.5 0.0 - 1.2 mg/dL    ALT 25 10 - 52 U/L   Troponin I, High Sensitivity   Result Value Ref Range    Troponin I, High Sensitivity 5 0 - 20 ng/L   aPTT   Result Value Ref Range    aPTT 28 27 - 38 seconds   Protime-INR   Result Value Ref Range    Protime 12.3 9.8 - 12.8 seconds    INR 1.1 0.9 - 1.1   Sars-CoV-2 and Influenza A/B PCR   Result Value Ref Range    Flu A Result Not Detected Not Detected    Flu B Result Not Detected Not Detected    Coronavirus 2019, PCR Not Detected Not Detected   Basic metabolic panel   Result Value Ref Range    Glucose 110 (H) 74 - 99 mg/dL    Sodium 138 136 - 145 mmol/L    Potassium 3.5 3.5 - 5.3 mmol/L    Chloride 101 98 - 107 mmol/L    Bicarbonate 28 21 - 32 mmol/L    Anion Gap 13 10 - 20 mmol/L    Urea Nitrogen 16 6 - 23 mg/dL    Creatinine 1.14 0.50 - 1.30 mg/dL    eGFR 68 >60 mL/min/1.73m*2    Calcium 8.5 (L) 8.6 - 10.3 mg/dL   CBC   Result Value Ref Range    WBC 11.7 (H) 4.4 - 11.3 x10*3/uL    nRBC 0.0 0.0 - 0.0 /100 WBCs    RBC 4.75 4.50 - 5.90 x10*6/uL    Hemoglobin 15.2 13.5 - 17.5 g/dL    Hematocrit 44.3 41.0 - 52.0 %    MCV 93 80 - 100 fL    MCH 32.0 26.0 - 34.0 pg    MCHC 34.3 32.0 - 36.0 g/dL    RDW 12.6 11.5 - 14.5 %    Platelets 197 150 - 450 x10*3/uL   POCT GLUCOSE   Result Value Ref Range    POCT Glucose 142 (H) 74 - 99 mg/dL        Assessment/Plan   Principal Problem:    Falls, initial encounter  Left 5-8th rib rx  some subcutaneous air     For CXR  Follow up on the above  Pain control    PTOT    -Continue current treatment as ordered. Will make adjustments as necessary.    Consider discharge later  today     Plan of care discussed with: Provider, RN, Patient.   + wife at bed side     Patient case and plan of care discussed with Dr. MALINA Lagos.    Toñito Hewitt, LUCHO - CNP  -In collaboration with Dr. MALINA Lagos    [facility]  Office: [phone]  Fax: [phone]  Pt seen this evening   Admitted for fall and did have pain in the upper chest   Left sided rib fracture  No LOC  O.e no distress  Chesr clear  [facility] regular  Ext no edema  Aox 3  Labs noted and dw pt  Will plan on dc to home  See dc orders  Carlos Lagos MD

## 2024-02-26 NOTE — PROGRESS NOTES
Home with wife      02/26/24 1612   Current Planned Discharge Disposition   Current Planned Discharge Disposition Home

## 2024-02-26 NOTE — PROGRESS NOTES
Transitional Care Coordination Progress Note:  Plan per Medical/Surgical team: treatment of fall down bleachers with tylenol, IV Dilaudid, IV morphine, lidocaine pathc, PT/OT german pending  Status: Observation  Payor source: [employer]  Discharge disposition: Home with wife   Potential Barriers: pain left side  ADOD: 2/26/2024  LEX Del Real RN, BSN Transitional Care Coordinator ED# [phone]      02/26/24 0820   Discharge Planning   Living Arrangements Spouse/significant other   Support Systems Spouse/significant other   Assistance Needed pain management, PT.OT german   Type of Residence Private residence   Number of Stairs to Enter Residence 3   Number of Stairs Within Residence 14   Home or Post Acute Services None   Patient expects to be discharged to: Home with wife   Does the patient need discharge transport arranged? Yes   RoundTrip coordination needed? Yes   Has discharge transport been arranged? No   Financial Resource Strain   How hard is it for you to pay for the very basics like food, housing, medical care, and heating? Not hard   Housing Stability   In the last 12 months, was there a time when you were not able to pay the mortgage or rent on time? N   In the last 12 months, how many places have you lived? 1   In the last 12 months, was there a time when you did not have a steady place to sleep or slept in a shelter (including now)? N   Transportation Needs   In the past 12 months, has lack of transportation kept you from medical appointments or from getting medications? no   In the past 12 months, has lack of transportation kept you from meetings, work, or from getting things needed for daily living? No

## 2024-02-26 NOTE — PROGRESS NOTES
02/26/24 0820   ACS Disability Status   Are you deaf or do you have serious difficulty hearing? N   Are you blind or do you have serious difficulty seeing, even when wearing glasses? N   Because of a physical, mental, or emotional condition, do you have serious difficulty concentrating, remembering, or making decisions? (5 years old or older) N   Do you have serious difficulty walking or climbing stairs? Y   Do you have serious difficulty dressing or bathing? N   Because of a physical, mental, or emotional condition, do you have serious difficulty doing errands alone such as visiting the doctor? Y

## 2024-02-26 NOTE — PROGRESS NOTES
"Occupational Therapy                 Therapy Communication Note    Patient Name: Anthony Henry \"Jack\"  MRN: [id number]  Today's Date: 2/26/2024     Discipline: Occupational Therapy    (Referral received, chart reviewed, however spoke with physical therapist and patient is independent with ADLs and transfers. No skilled OT needs requried. Screen completed only, will discontinue OT order.)      "

## 2024-02-26 NOTE — PROGRESS NOTES
"Pharmacy Medication History Review    Anthony Henry \"Romario" is a 73 y.o. male admitted for Falls, initial encounter. Pharmacy reviewed the patient's [id number] medications and allergies for accuracy.    The list below reflectives the updated PTA list. Please review each medication in order reconciliation for additional clarification and justification.       The list below reflectives the updated allergy list. Please review each documented allergy for additional clarification and justification.  Allergies  Reviewed by Keri Sparks RN on 2/26/2024   No Known Allergies         Below are additional concerns with the patient's PTA list.  Prior to Admission Medications   Prescriptions Last Dose Informant   POTASSIUM CHLORIDE ORAL 2/25/2024 Self   Sig: Take 5 mEq by mouth once daily. Take a half a tablet (5 meq)   acetaminophen (Tylenol 8 HOUR) 650 mg ER tablet 2/25/2024 Self   Sig: Take 1 tablet (650 mg) by mouth every 8 hours if needed for mild pain (1 - 3). Do not crush, chew, or split.   celecoxib (CeleBREX) 200 mg capsule 2/26/2024    Sig: TAKE ONE CAPSULE BY MOUTH EVERY DAY   cloNIDine (Catapres-TTS) 0.3 mg/24 hr patch 2/25/2024    Sig: Place 1 patch on the skin 1 (one) time per week.   Patient taking differently: Place 1 patch on the skin 1 (one) time per week. On Sundays   cyanocobalamin, vitamin B-12, 5,000 mcg capsule 2/26/2024 Self   Sig: Take 5,000 mcg by mouth once daily in the morning.   hydrALAZINE (Apresoline) 50 mg tablet 2/26/2024    Sig: Take 1 tablet (50 mg) by mouth 2 times a day.   multivitamin tablet 2/26/2024    Sig: Take 1 tablet by mouth once daily.   saw palmetto 160 mg capsule 2/26/2024    Sig: Take 1 capsule (160 mg) by mouth once daily.   terazosin (Hytrin) 2 mg capsule 2/25/2024    Sig: Take 1 capsule (2 mg) by mouth once daily at bedtime.   triamterene-hydrochlorothiazid (Maxzide) 75-50 mg tablet 2/26/2024    Sig: Take 1 tablet by mouth once daily.      Facility-Administered " Medications: None        Laura Richardson CPhT

## 2024-02-27 ENCOUNTER — PATIENT OUTREACH (OUTPATIENT)
Dept: CARE COORDINATION | Facility: CLINIC | Age: 74
End: 2024-02-27
Payer: MEDICARE

## 2024-02-27 LAB
ATRIAL RATE: 83 BPM
P AXIS: 41 DEGREES
P OFFSET: 188 MS
P ONSET: 139 MS
PR INTERVAL: 152 MS
Q ONSET: 215 MS
QRS COUNT: 14 BEATS
QRS DURATION: 86 MS
QT INTERVAL: 372 MS
QTC CALCULATION(BAZETT): 437 MS
QTC FREDERICIA: 414 MS
R AXIS: 24 DEGREES
T AXIS: -6 DEGREES
T OFFSET: 401 MS
VENTRICULAR RATE: 83 BPM

## 2024-02-27 NOTE — PROGRESS NOTES
Discharge Facility:[facility]  Discharge Diagnosis:Left back flank pain  Admission Date:02/25/24  Discharge Date: 02/26/24    PCP Appointment Date:03/11/24  Specialist Appointment Date:   Hospital Encounter and Summary: Linked   See discharge assessment below for further details  Engagement  Call Start Time: 0852 (2/27/2024  8:52 AM)    Medications  Medications reviewed with patient/caregiver?: Yes (flwxeril 5mg oxycodone 5mg and  narcan) (2/27/2024  8:52 AM)  Is the patient having any side effects they believe may be caused by any medication additions or changes?: No (2/27/2024  8:52 AM)  Does the patient have all medications ordered at discharge?: Yes (2/27/2024  8:52 AM)    Appointments  Does the patient have a primary care provider?: Yes (2/27/2024  8:52 AM)  Care Management Interventions: Verified appointment date/time/provider (2/27/2024  8:52 AM)  Care Management Interventions: Advised patient to keep appointment (2/27/2024  8:52 AM)    Self Management  Has home health visited the patient within 72 hours of discharge?: No (2/27/2024  8:52 AM)  Has all Durable Medical Equipment (DME) been delivered?: No (2/27/2024  8:52 AM)    Patient Teaching  Does the patient have access to their discharge instructions?: Yes (2/27/2024  8:52 AM)  Care Management Interventions: Reviewed instructions with patient (2/27/2024  8:52 AM)  What is the patient's perception of their health status since discharge?: Same (still having alot of pain) (2/27/2024  8:52 AM)    Wrap Up  Call End Time: 0900 (2/27/2024  8:52 AM)

## 2024-02-29 ENCOUNTER — HOSPITAL ENCOUNTER (OUTPATIENT)
Dept: RADIATION ONCOLOGY | Facility: CLINIC | Age: 74
Setting detail: RADIATION/ONCOLOGY SERIES
Discharge: HOME | End: 2024-02-29
Payer: MEDICARE

## 2024-02-29 DIAGNOSIS — C61 MALIGNANT NEOPLASM OF PROSTATE (MULTI): ICD-10-CM

## 2024-02-29 DIAGNOSIS — Z51.0 ENCOUNTER FOR ANTINEOPLASTIC RADIATION THERAPY: ICD-10-CM

## 2024-02-29 LAB
RAD ONC MSQ ACTUAL FRACTIONS DELIVERED: 1
RAD ONC MSQ ACTUAL SESSION DELIVERED DOSE: 800 CGRAY
RAD ONC MSQ ACTUAL TOTAL DOSE: 800 CGRAY
RAD ONC MSQ ELAPSED DAYS: 0
RAD ONC MSQ LAST DATE: NORMAL
RAD ONC MSQ PRESCRIBED FRACTIONAL DOSE: 800 CGRAY
RAD ONC MSQ PRESCRIBED NUMBER OF FRACTIONS: 5
RAD ONC MSQ PRESCRIBED TECHNIQUE: NORMAL
RAD ONC MSQ PRESCRIBED TOTAL DOSE: 4000 CGRAY
RAD ONC MSQ PRESCRIPTION PATTERN COMMENT: NORMAL
RAD ONC MSQ START DATE: NORMAL
RAD ONC MSQ TREATMENT COURSE NUMBER: 1
RAD ONC MSQ TREATMENT SITE: NORMAL

## 2024-02-29 PROCEDURE — 77373 STRTCTC BDY RAD THER TX DLVR: CPT | Performed by: STUDENT IN AN ORGANIZED HEALTH CARE EDUCATION/TRAINING PROGRAM

## 2024-02-29 PROCEDURE — 77280 THER RAD SIMULAJ FIELD SMPL: CPT | Performed by: STUDENT IN AN ORGANIZED HEALTH CARE EDUCATION/TRAINING PROGRAM

## 2024-03-04 ENCOUNTER — HOSPITAL ENCOUNTER (OUTPATIENT)
Dept: RADIATION ONCOLOGY | Facility: CLINIC | Age: 74
Setting detail: RADIATION/ONCOLOGY SERIES
Discharge: HOME | End: 2024-03-04
Payer: MEDICARE

## 2024-03-04 DIAGNOSIS — C61 MALIGNANT NEOPLASM OF PROSTATE (MULTI): ICD-10-CM

## 2024-03-04 DIAGNOSIS — Z51.0 ENCOUNTER FOR ANTINEOPLASTIC RADIATION THERAPY: ICD-10-CM

## 2024-03-04 LAB
RAD ONC MSQ ACTUAL FRACTIONS DELIVERED: 2
RAD ONC MSQ ACTUAL SESSION DELIVERED DOSE: 800 CGRAY
RAD ONC MSQ ACTUAL TOTAL DOSE: 1600 CGRAY
RAD ONC MSQ ELAPSED DAYS: 4
RAD ONC MSQ LAST DATE: NORMAL
RAD ONC MSQ PRESCRIBED FRACTIONAL DOSE: 800 CGRAY
RAD ONC MSQ PRESCRIBED NUMBER OF FRACTIONS: 5
RAD ONC MSQ PRESCRIBED TECHNIQUE: NORMAL
RAD ONC MSQ PRESCRIBED TOTAL DOSE: 4000 CGRAY
RAD ONC MSQ PRESCRIPTION PATTERN COMMENT: NORMAL
RAD ONC MSQ START DATE: NORMAL
RAD ONC MSQ TREATMENT COURSE NUMBER: 1
RAD ONC MSQ TREATMENT SITE: NORMAL

## 2024-03-04 PROCEDURE — 77336 RADIATION PHYSICS CONSULT: CPT | Performed by: STUDENT IN AN ORGANIZED HEALTH CARE EDUCATION/TRAINING PROGRAM

## 2024-03-04 PROCEDURE — 77373 STRTCTC BDY RAD THER TX DLVR: CPT | Performed by: STUDENT IN AN ORGANIZED HEALTH CARE EDUCATION/TRAINING PROGRAM

## 2024-03-05 ENCOUNTER — APPOINTMENT (OUTPATIENT)
Dept: RADIATION ONCOLOGY | Facility: CLINIC | Age: 74
End: 2024-03-05
Payer: MEDICARE

## 2024-03-06 ENCOUNTER — HOSPITAL ENCOUNTER (OUTPATIENT)
Dept: RADIATION ONCOLOGY | Facility: CLINIC | Age: 74
Setting detail: RADIATION/ONCOLOGY SERIES
Discharge: HOME | End: 2024-03-06
Payer: MEDICARE

## 2024-03-06 DIAGNOSIS — Z51.0 ENCOUNTER FOR ANTINEOPLASTIC RADIATION THERAPY: ICD-10-CM

## 2024-03-06 DIAGNOSIS — C61 MALIGNANT NEOPLASM OF PROSTATE (MULTI): ICD-10-CM

## 2024-03-06 LAB
RAD ONC MSQ ACTUAL FRACTIONS DELIVERED: 3
RAD ONC MSQ ACTUAL SESSION DELIVERED DOSE: 800 CGRAY
RAD ONC MSQ ACTUAL TOTAL DOSE: 2400 CGRAY
RAD ONC MSQ ELAPSED DAYS: 6
RAD ONC MSQ LAST DATE: NORMAL
RAD ONC MSQ PRESCRIBED FRACTIONAL DOSE: 800 CGRAY
RAD ONC MSQ PRESCRIBED NUMBER OF FRACTIONS: 5
RAD ONC MSQ PRESCRIBED TECHNIQUE: NORMAL
RAD ONC MSQ PRESCRIBED TOTAL DOSE: 4000 CGRAY
RAD ONC MSQ PRESCRIPTION PATTERN COMMENT: NORMAL
RAD ONC MSQ START DATE: NORMAL
RAD ONC MSQ TREATMENT COURSE NUMBER: 1
RAD ONC MSQ TREATMENT SITE: NORMAL

## 2024-03-06 PROCEDURE — 77373 STRTCTC BDY RAD THER TX DLVR: CPT | Performed by: STUDENT IN AN ORGANIZED HEALTH CARE EDUCATION/TRAINING PROGRAM

## 2024-03-07 ENCOUNTER — APPOINTMENT (OUTPATIENT)
Dept: RADIATION ONCOLOGY | Facility: CLINIC | Age: 74
End: 2024-03-07
Payer: MEDICARE

## 2024-03-08 ENCOUNTER — HOSPITAL ENCOUNTER (OUTPATIENT)
Dept: RADIATION ONCOLOGY | Facility: CLINIC | Age: 74
Setting detail: RADIATION/ONCOLOGY SERIES
Discharge: HOME | End: 2024-03-08
Payer: MEDICARE

## 2024-03-08 ENCOUNTER — PATIENT OUTREACH (OUTPATIENT)
Dept: CARE COORDINATION | Facility: CLINIC | Age: 74
End: 2024-03-08
Payer: MEDICARE

## 2024-03-08 DIAGNOSIS — Z51.0 ENCOUNTER FOR ANTINEOPLASTIC RADIATION THERAPY: ICD-10-CM

## 2024-03-08 DIAGNOSIS — R30.0 DYSURIA: Primary | ICD-10-CM

## 2024-03-08 DIAGNOSIS — C61 MALIGNANT NEOPLASM OF PROSTATE (MULTI): ICD-10-CM

## 2024-03-08 LAB
AMORPH CRY #/AREA UR COMP ASSIST: ABNORMAL /HPF
APPEARANCE UR: ABNORMAL
BACTERIA #/AREA URNS AUTO: ABNORMAL /HPF
BILIRUB UR STRIP.AUTO-MCNC: NEGATIVE MG/DL
COLOR UR: YELLOW
GLUCOSE UR STRIP.AUTO-MCNC: NORMAL MG/DL
HYALINE CASTS #/AREA URNS AUTO: ABNORMAL /LPF
KETONES UR STRIP.AUTO-MCNC: NEGATIVE MG/DL
LEUKOCYTE ESTERASE UR QL STRIP.AUTO: ABNORMAL
MUCOUS THREADS #/AREA URNS AUTO: ABNORMAL /LPF
NITRITE UR QL STRIP.AUTO: NEGATIVE
PH UR STRIP.AUTO: 5.5 [PH]
PROT UR STRIP.AUTO-MCNC: ABNORMAL MG/DL
RAD ONC MSQ ACTUAL FRACTIONS DELIVERED: 4
RAD ONC MSQ ACTUAL SESSION DELIVERED DOSE: 800 CGRAY
RAD ONC MSQ ACTUAL TOTAL DOSE: 3200 CGRAY
RAD ONC MSQ ELAPSED DAYS: 8
RAD ONC MSQ LAST DATE: NORMAL
RAD ONC MSQ PRESCRIBED FRACTIONAL DOSE: 800 CGRAY
RAD ONC MSQ PRESCRIBED NUMBER OF FRACTIONS: 5
RAD ONC MSQ PRESCRIBED TECHNIQUE: NORMAL
RAD ONC MSQ PRESCRIBED TOTAL DOSE: 4000 CGRAY
RAD ONC MSQ PRESCRIPTION PATTERN COMMENT: NORMAL
RAD ONC MSQ START DATE: NORMAL
RAD ONC MSQ TREATMENT COURSE NUMBER: 1
RAD ONC MSQ TREATMENT SITE: NORMAL
RBC # UR STRIP.AUTO: NEGATIVE /UL
RBC #/AREA URNS AUTO: ABNORMAL /HPF
SP GR UR STRIP.AUTO: 1.03
UROBILINOGEN UR STRIP.AUTO-MCNC: NORMAL MG/DL
WBC #/AREA URNS AUTO: ABNORMAL /HPF

## 2024-03-08 PROCEDURE — 87086 URINE CULTURE/COLONY COUNT: CPT | Performed by: STUDENT IN AN ORGANIZED HEALTH CARE EDUCATION/TRAINING PROGRAM

## 2024-03-08 PROCEDURE — 77373 STRTCTC BDY RAD THER TX DLVR: CPT | Performed by: RADIOLOGY

## 2024-03-08 PROCEDURE — 81001 URINALYSIS AUTO W/SCOPE: CPT | Performed by: STUDENT IN AN ORGANIZED HEALTH CARE EDUCATION/TRAINING PROGRAM

## 2024-03-08 NOTE — PROGRESS NOTES
Unable to reach patient for call back after patient's follow up appointment with PCP.   JEREMIASM with call back number for patient to call if needed   If no voicemail available call attempts x 2 were made to contact the patient to assist with any questions or concerns patient may have.

## 2024-03-09 LAB — HOLD SPECIMEN: NORMAL

## 2024-03-11 ENCOUNTER — HOSPITAL ENCOUNTER (OUTPATIENT)
Dept: RADIATION ONCOLOGY | Facility: CLINIC | Age: 74
Setting detail: RADIATION/ONCOLOGY SERIES
Discharge: HOME | End: 2024-03-11
Payer: MEDICARE

## 2024-03-11 ENCOUNTER — RADIATION ONCOLOGY OTV (OUTPATIENT)
Dept: RADIATION ONCOLOGY | Facility: CLINIC | Age: 74
End: 2024-03-11

## 2024-03-11 ENCOUNTER — APPOINTMENT (OUTPATIENT)
Dept: PRIMARY CARE | Facility: CLINIC | Age: 74
End: 2024-03-11
Payer: MEDICARE

## 2024-03-11 ENCOUNTER — DOCUMENTATION (OUTPATIENT)
Dept: RADIATION ONCOLOGY | Facility: CLINIC | Age: 74
End: 2024-03-11

## 2024-03-11 VITALS
SYSTOLIC BLOOD PRESSURE: 171 MMHG | HEART RATE: 69 BPM | RESPIRATION RATE: 16 BRPM | TEMPERATURE: 98.7 F | OXYGEN SATURATION: 97 % | DIASTOLIC BLOOD PRESSURE: 73 MMHG | WEIGHT: 213.6 LBS | BODY MASS INDEX: 28.97 KG/M2

## 2024-03-11 DIAGNOSIS — C61 PROSTATE CANCER (MULTI): Primary | ICD-10-CM

## 2024-03-11 DIAGNOSIS — C61 MALIGNANT NEOPLASM OF PROSTATE (MULTI): ICD-10-CM

## 2024-03-11 DIAGNOSIS — Z51.0 ENCOUNTER FOR ANTINEOPLASTIC RADIATION THERAPY: ICD-10-CM

## 2024-03-11 LAB
BACTERIA UR CULT: NO GROWTH
RAD ONC MSQ ACTUAL FRACTIONS DELIVERED: 5
RAD ONC MSQ ACTUAL SESSION DELIVERED DOSE: 800 CGRAY
RAD ONC MSQ ACTUAL TOTAL DOSE: 4000 CGRAY
RAD ONC MSQ ELAPSED DAYS: 11
RAD ONC MSQ LAST DATE: NORMAL
RAD ONC MSQ PRESCRIBED FRACTIONAL DOSE: 800 CGRAY
RAD ONC MSQ PRESCRIBED NUMBER OF FRACTIONS: 5
RAD ONC MSQ PRESCRIBED TECHNIQUE: NORMAL
RAD ONC MSQ PRESCRIBED TOTAL DOSE: 4000 CGRAY
RAD ONC MSQ PRESCRIPTION PATTERN COMMENT: NORMAL
RAD ONC MSQ START DATE: NORMAL
RAD ONC MSQ TREATMENT COURSE NUMBER: 1
RAD ONC MSQ TREATMENT SITE: NORMAL

## 2024-03-11 PROCEDURE — 77373 STRTCTC BDY RAD THER TX DLVR: CPT | Performed by: STUDENT IN AN ORGANIZED HEALTH CARE EDUCATION/TRAINING PROGRAM

## 2024-03-11 PROCEDURE — 77435 SBRT MANAGEMENT: CPT | Performed by: STUDENT IN AN ORGANIZED HEALTH CARE EDUCATION/TRAINING PROGRAM

## 2024-03-11 NOTE — PROGRESS NOTES
RADIATION ONCOLOGY ON-TREATMENT VISIT NOTE  Patient Name:  Anthony Henry  MRN:  [id number]  :  1950    Radiation Oncologist: Elissa Iqbal MD PhD  Primary Care Provider: Lindsay Bone DO  Care Team: Patient Care Team:  Lindsay Bone DO as PCP - General (Family Medicine)  Lindsay Bone DO as PCP - [employer] PCP  Anastasiia Cristina LPN as Care Manager (Case Management)    Date of Service: 3/11/2024     Anthony Henry is a 73 y.o.-year-old with:  Prostate cancer (CMS/HCC), Clinical: cT1c, cN0, PSA: 6.6, Grade Group: 2  Specialty Problems       Radiation Oncology Problems    Prostate cancer (CMS/HCC)     Treatment Summary:  Radiation Treatments       Active   No active radiation treatments to show.     Completed   ProstSV (Started on 2024)   Most recent fraction: 800 cGy given on 3/11/2024   Total given: 4,000 cGy / 4,000 cGy  (5 of 5 fractions)   Elapsed Days: 11   Technique: SBRT                  Concurrent systemic therapy: none    SUBJECTIVE: reports increased burning and straining with urination.  Confirmed that he has been taking terazosin daily. Reports burning has resolved with Tylenol.   OBJECTIVE:   Vital Signs:  /73 (BP Location: Right arm, Patient Position: Sitting, BP Cuff Size: Adult)   Pulse 69   Temp 37.1 °C (98.7 °F) (Core)   Resp 16   Wt 96.9 kg (213 lb 9.6 oz)   SpO2 97%   BMI 28.97 kg/m²    3/8/24 UA:   Component      Latest Ref Rng 3/8/2024   WBC, Urine      1-5, NONE /HPF 6-10 !    RBC, Urine      NONE, 1-2, 3-5 /HPF 1-2    Bacteria, Urine      NONE SEEN /HPF 1+ !    Mucus, Urine      Reference range not established. /LPF 2+    Hyaline Casts, Urine      NONE /LPF OCCASIONAL !    Amorphous Crystals, Urine      NONE, 1+, 2+ /HPF 2+      3/8/24 Urine culture with no growth    Pain Scale: 310.  Broken ribs (prior to RT)    Toxicity Assessment          3/11/2024    15:34   Toxicity Assessment   Adverse Events Reviewed (WDL) No (Exceptions to WDL)   Treatment Site  Pelvis - male   Anorexia Grade 0   Anxiety Grade 0   Dehydration Grade 0   Depression Grade 0   Dermatitis Radiation Grade 0   Diarrhea Grade 0   Fatigue Grade 0   Fibrosis Deep Connective Tissue Grade 0   Fracture Grade 0   Nausea Grade 0   Pain Grade 1       3/10 rib pain from a fall.  unrelated to XRT   Treatment Related Secondary Malignancy Grade 0   Tumor Pain Grade 0   Vomiting Grade 0   Abdominal Pain Grade 0   Bloating Grade 0   Constipation Grade 0   Dysphagia Grade 0   Enterovesical Fistula Grade 0   Fecal Incontinence Grade 0   Lower Gastrointestinal Hemorrhage Grade 0   Mucositis Oral Grade 0   Proctitis Grade 0   Rectal Hemorrhage Grade 0   Rectal Pain Grade 0   Rectal Ulcer Grade 0   Small Intestinal Obstruction Grade 0   Cystitis Noninfective Grade 0   Hematuria Grade 0   Urinary Incontinence Grade 0   Erectile Dysfunction Grade 0   Dry Mouth Grade 0   Edema Limbs Grade 0   Gastric Fistula Grade 0   Rectal Mucositis Grade 0   Rectal Stenosis Grade 0   Bladder Spasm Grade 0   Urinary Frequency Grade 1   Urinary Retention Grade 1   Urinary Tract Obstruction Grade 0   Urinary Tract Pain Grade 0   Urinary Urgency Grade 0   Ejaculation Disorder Grade 0        ASSESSMENT/PLAN:  The patient is tolerating radiation therapy as anticipated. Treatment complete, follow-up as scheduled.   No evidence of UTI on UA/UCx. Will let us know if dysuria returns.     Elissa Iqbal  , Radiation Oncology

## 2024-03-12 ENCOUNTER — APPOINTMENT (OUTPATIENT)
Dept: PRIMARY CARE | Facility: CLINIC | Age: 74
End: 2024-03-12
Payer: MEDICARE

## 2024-03-12 NOTE — PROGRESS NOTES
RADIATION COMPLETION OF THERAPY NOTE    Patient Name:  Anthony Henry  MRN:  [id number]  :  1950    Radiation Oncologist: Elissa Iqbal MD PhD  Referring Provider: No ref. provider found  Primary Care Provider: Lindsay Bone DO    Brief History: Anthony Henry is a 73 y.o. male with Prostate cancer (CMS/HCC), Clinical: cT1c, cN0, PSA: 6.6, Grade Group: 2.      The patient completed radiotherapy as outlined below.    Radiation Treatment Summary:    Radiation Treatments       Active   No active radiation treatments to show.     Completed   ProstSV (Started on 2024)   Most recent fraction: 800 cGy given on 3/11/2024   Total given: 4,000 cGy / 4,000 cGy  (5 of 5 fractions)   Elapsed Days: 11   Technique: SBRT                 Concurrent Systemic Therapy: none     CTCAE Toxicity Overview:   Toxicity Assessment          3/11/2024    15:34   Toxicity Assessment   Adverse Events Reviewed (WDL) No (Exceptions to WDL)   Treatment Site Pelvis - male   Anorexia Grade 0   Anxiety Grade 0   Dehydration Grade 0   Depression Grade 0   Dermatitis Radiation Grade 0   Diarrhea Grade 0   Fatigue Grade 0   Fibrosis Deep Connective Tissue Grade 0   Fracture Grade 0   Nausea Grade 0   Pain Grade 1       3/10 rib pain from a fall.  unrelated to XRT   Treatment Related Secondary Malignancy Grade 0   Tumor Pain Grade 0   Vomiting Grade 0   Abdominal Pain Grade 0   Bloating Grade 0   Constipation Grade 0   Dysphagia Grade 0   Enterovesical Fistula Grade 0   Fecal Incontinence Grade 0   Lower Gastrointestinal Hemorrhage Grade 0   Mucositis Oral Grade 0   Proctitis Grade 0   Rectal Hemorrhage Grade 0   Rectal Pain Grade 0   Rectal Ulcer Grade 0   Small Intestinal Obstruction Grade 0   Cystitis Noninfective Grade 0   Hematuria Grade 0   Urinary Incontinence Grade 0   Erectile Dysfunction Grade 0   Dry Mouth Grade 0   Edema Limbs Grade 0   Gastric Fistula Grade 0   Rectal Mucositis Grade 0   Rectal Stenosis Grade 0   Bladder Spasm Grade  0   Urinary Frequency Grade 1   Urinary Retention Grade 1   Urinary Tract Obstruction Grade 0   Urinary Tract Pain Grade 0   Urinary Urgency Grade 0   Ejaculation Disorder Grade 0     Patient Disposition: Mr. Henry will have a virtual follow-up visit on 6/13/2024 with Eric Schmidt CNP.  Mr. Henry is instructed to contact the clinic with any concern prior to the follow-up appointment.      Future Appointments   Date Time Provider Department Center   5/21/2024  9:00 AM Lindsay Bone DO [id number] [facility]   6/13/2024  9:30 AM LUCHO Iraheta-CNP [id number] [facility]

## 2024-03-14 ENCOUNTER — APPOINTMENT (OUTPATIENT)
Dept: PRIMARY CARE | Facility: CLINIC | Age: 74
End: 2024-03-14
Payer: MEDICARE

## 2024-03-20 ENCOUNTER — TELEPHONE (OUTPATIENT)
Dept: RADIATION ONCOLOGY | Facility: CLINIC | Age: 74
End: 2024-03-20
Payer: MEDICARE

## 2024-03-20 ENCOUNTER — LAB (OUTPATIENT)
Dept: LAB | Facility: CLINIC | Age: 74
End: 2024-03-20
Payer: MEDICARE

## 2024-03-20 DIAGNOSIS — R39.12 WEAK URINARY STREAM: Primary | ICD-10-CM

## 2024-03-20 DIAGNOSIS — R30.0 DYSURIA: Primary | ICD-10-CM

## 2024-03-20 DIAGNOSIS — R30.0 DYSURIA: ICD-10-CM

## 2024-03-20 LAB
APPEARANCE UR: CLEAR
BILIRUB UR STRIP.AUTO-MCNC: NEGATIVE MG/DL
COLOR UR: YELLOW
GLUCOSE UR STRIP.AUTO-MCNC: NEGATIVE MG/DL
KETONES UR STRIP.AUTO-MCNC: NEGATIVE MG/DL
LEUKOCYTE ESTERASE UR QL STRIP.AUTO: NEGATIVE
NITRITE UR QL STRIP.AUTO: NEGATIVE
PH UR STRIP.AUTO: 7 [PH]
PROT UR STRIP.AUTO-MCNC: NEGATIVE MG/DL
RBC # UR STRIP.AUTO: NEGATIVE /UL
SP GR UR STRIP.AUTO: 1.01
UROBILINOGEN UR STRIP.AUTO-MCNC: 0.2 MG/DL

## 2024-03-20 PROCEDURE — 81003 URINALYSIS AUTO W/O SCOPE: CPT

## 2024-03-20 RX ORDER — TAMSULOSIN HYDROCHLORIDE 0.4 MG/1
0.4 CAPSULE ORAL DAILY
Qty: 30 CAPSULE | Refills: 1 | Status: SHIPPED | OUTPATIENT
Start: 2024-03-20 | End: 2024-05-21 | Stop reason: SDUPTHER

## 2024-03-20 NOTE — TELEPHONE ENCOUNTER
UA was negative. Flomax prescribed. Stop saw palmetto.     Elissa Iqbal MD PhD  , Radiation Oncology

## 2024-03-26 ENCOUNTER — PATIENT OUTREACH (OUTPATIENT)
Dept: CARE COORDINATION | Facility: CLINIC | Age: 74
End: 2024-03-26
Payer: MEDICARE

## 2024-04-11 NOTE — DISCHARGE SUMMARY
Discharge Diagnosis  Falls, initial encounter    Issues Requiring Follow-Up  Rib fracture    Discharge Meds     Your medication list        START taking these medications        Instructions Last Dose Given Next Dose Due   cyclobenzaprine 5 mg tablet  Commonly known as: Flexeril      Take 1 tablet (5 mg) by mouth 3 times a day as needed for muscle spasms.       naloxone 4 mg/0.1 mL nasal spray  Commonly known as: Narcan      Administer 1 spray (4 mg) into affected nostril(s) if needed for opioid reversal or respiratory depression. May repeat every 2-3 minutes if needed, alternating nostrils, until medical assistance becomes available.       oxyCODONE-acetaminophen 5-325 mg tablet  Commonly known as: Percocet      Take 1 tablet by mouth every 6 hours if needed for moderate pain (4 - 6) or severe pain (7 - 10) for up to 3 days.              CONTINUE taking these medications        Instructions Last Dose Given Next Dose Due   acetaminophen 650 mg ER tablet  Commonly known as: Tylenol 8 HOUR           celecoxib 200 mg capsule  Commonly known as: CeleBREX      TAKE ONE CAPSULE BY MOUTH EVERY DAY       cloNIDine 0.3 mg/24 hr patch  Commonly known as: Catapres-TTS      Place 1 patch on the skin 1 (one) time per week. On Sundays       cyanocobalamin (vitamin B-12) 5,000 mcg capsule           hydrALAZINE 50 mg tablet  Commonly known as: Apresoline      Take 1 tablet (50 mg) by mouth 2 times a day.       multivitamin tablet           POTASSIUM CHLORIDE ORAL           saw palmetto 160 mg capsule      Take 1 capsule (160 mg) by mouth once daily.       terazosin 2 mg capsule  Commonly known as: Hytrin      Take 1 capsule (2 mg) by mouth once daily at bedtime.       triamterene-hydrochlorothiazid 75-50 mg tablet  Commonly known as: Maxzide      Take 1 tablet by mouth once daily.                 Where to Get Your Medications        These medications were sent to [id number] [place], [place] - [address] [facility]  [address]  [facility], [place] [id number]      Phone: [phone]   cyclobenzaprine 5 mg tablet  naloxone 4 mg/0.1 mL nasal spray       You can get these medications from any pharmacy    Bring a paper prescription for each of these medications  oxyCODONE-acetaminophen 5-325 mg tablet       Information about where to get these medications is not yet available    Ask your nurse or doctor about these medications  cloNIDine 0.3 mg/24 hr patch         Test Results Pending At Discharge  Pending Labs       No current pending labs.            Hospital Course   Admitted to hospital for fall and rib fracture  Fall was not related to any syncope or LOC  Pt discharged to home    Pertinent Physical Exam At Time of Discharge  Physical Exam    Outpatient Follow-Up  Future Appointments   Date Time Provider Department Center   5/21/2024  9:00 AM Lindsay Bone DO [id number] [facility]   6/13/2024  9:30 AM Eric Schmidt, APRN-CNP [id number]         Carlos Lagos MD

## 2024-04-30 DIAGNOSIS — I10 PRIMARY HYPERTENSION: ICD-10-CM

## 2024-04-30 RX ORDER — TRIAMTERENE AND HYDROCHLOROTHIAZIDE 75; 50 MG/1; MG/1
1 TABLET ORAL
Qty: 30 TABLET | Refills: 0 | Status: SHIPPED | OUTPATIENT
Start: 2024-04-30 | End: 2024-05-21 | Stop reason: SDUPTHER

## 2024-04-30 NOTE — TELEPHONE ENCOUNTER
Patient called in a left a VM that he is needing his BP medication- I called him back and left a message that he is due for an appointment this upcoming month, pended 30 days.     And that he needed to call back to schedule.

## 2024-05-02 DIAGNOSIS — I10 PRIMARY HYPERTENSION: ICD-10-CM

## 2024-05-02 RX ORDER — CLONIDINE 0.3 MG/24H
1 PATCH, EXTENDED RELEASE TRANSDERMAL
Qty: 4 PATCH | Refills: 0 | Status: SHIPPED | OUTPATIENT
Start: 2024-05-05 | End: 2024-05-21 | Stop reason: SDUPTHER

## 2024-05-02 NOTE — TELEPHONE ENCOUNTER
Patient calling in stating he needs his clonidine patch refilled to get him to his appointment with you, are you refilling this? It looks like another doctor had called him in this last time.    Pended if you are sending in, if not let me know so I can let patient know.

## 2024-05-13 ENCOUNTER — TELEPHONE (OUTPATIENT)
Dept: PRIMARY CARE | Facility: CLINIC | Age: 74
End: 2024-05-13
Payer: MEDICARE

## 2024-05-13 DIAGNOSIS — I10 PRIMARY HYPERTENSION: ICD-10-CM

## 2024-05-13 DIAGNOSIS — Z00.00 ANNUAL PHYSICAL EXAM: Primary | ICD-10-CM

## 2024-05-13 DIAGNOSIS — R73.03 PRE-DIABETES: ICD-10-CM

## 2024-05-13 DIAGNOSIS — E03.9 ACQUIRED HYPOTHYROIDISM: ICD-10-CM

## 2024-05-13 DIAGNOSIS — E78.5 DYSLIPIDEMIA: ICD-10-CM

## 2024-05-16 ENCOUNTER — LAB (OUTPATIENT)
Dept: LAB | Facility: LAB | Age: 74
End: 2024-05-16
Payer: MEDICARE

## 2024-05-16 DIAGNOSIS — E78.5 DYSLIPIDEMIA: ICD-10-CM

## 2024-05-16 DIAGNOSIS — E03.9 ACQUIRED HYPOTHYROIDISM: ICD-10-CM

## 2024-05-16 DIAGNOSIS — R73.03 PRE-DIABETES: ICD-10-CM

## 2024-05-16 DIAGNOSIS — I10 PRIMARY HYPERTENSION: ICD-10-CM

## 2024-05-16 LAB
ALBUMIN SERPL BCP-MCNC: 3.8 G/DL (ref 3.4–5)
ALP SERPL-CCNC: 85 U/L (ref 33–136)
ALT SERPL W P-5'-P-CCNC: 26 U/L (ref 10–52)
ANION GAP SERPL CALC-SCNC: 13 MMOL/L (ref 10–20)
AST SERPL W P-5'-P-CCNC: 21 U/L (ref 9–39)
BILIRUB SERPL-MCNC: 0.6 MG/DL (ref 0–1.2)
BUN SERPL-MCNC: 16 MG/DL (ref 6–23)
CALCIUM SERPL-MCNC: 8.9 MG/DL (ref 8.6–10.6)
CHLORIDE SERPL-SCNC: 105 MMOL/L (ref 98–107)
CHOLEST SERPL-MCNC: 165 MG/DL (ref 0–199)
CHOLESTEROL/HDL RATIO: 3.7
CO2 SERPL-SCNC: 28 MMOL/L (ref 21–32)
CREAT SERPL-MCNC: 1.08 MG/DL (ref 0.5–1.3)
EGFRCR SERPLBLD CKD-EPI 2021: 72 ML/MIN/1.73M*2
ERYTHROCYTE [DISTWIDTH] IN BLOOD BY AUTOMATED COUNT: 12.7 % (ref 11.5–14.5)
EST. AVERAGE GLUCOSE BLD GHB EST-MCNC: 120 MG/DL
GLUCOSE SERPL-MCNC: 118 MG/DL (ref 74–99)
HBA1C MFR BLD: 5.8 %
HCT VFR BLD AUTO: 42.4 % (ref 41–52)
HDLC SERPL-MCNC: 45 MG/DL
HGB BLD-MCNC: 14.3 G/DL (ref 13.5–17.5)
LDLC SERPL CALC-MCNC: 100 MG/DL
MCH RBC QN AUTO: 31 PG (ref 26–34)
MCHC RBC AUTO-ENTMCNC: 33.7 G/DL (ref 32–36)
MCV RBC AUTO: 92 FL (ref 80–100)
NON HDL CHOLESTEROL: 120 MG/DL (ref 0–149)
NRBC BLD-RTO: 0 /100 WBCS (ref 0–0)
PLATELET # BLD AUTO: 209 X10*3/UL (ref 150–450)
POTASSIUM SERPL-SCNC: 3.7 MMOL/L (ref 3.5–5.3)
PROT SERPL-MCNC: 6.1 G/DL (ref 6.4–8.2)
RBC # BLD AUTO: 4.62 X10*6/UL (ref 4.5–5.9)
SODIUM SERPL-SCNC: 142 MMOL/L (ref 136–145)
TRIGL SERPL-MCNC: 99 MG/DL (ref 0–149)
TSH SERPL-ACNC: 1.03 MIU/L (ref 0.44–3.98)
VLDL: 20 MG/DL (ref 0–40)
WBC # BLD AUTO: 6.2 X10*3/UL (ref 4.4–11.3)

## 2024-05-16 PROCEDURE — 83036 HEMOGLOBIN GLYCOSYLATED A1C: CPT

## 2024-05-16 PROCEDURE — 84443 ASSAY THYROID STIM HORMONE: CPT

## 2024-05-16 PROCEDURE — 80053 COMPREHEN METABOLIC PANEL: CPT

## 2024-05-16 PROCEDURE — 80061 LIPID PANEL: CPT

## 2024-05-16 PROCEDURE — 36415 COLL VENOUS BLD VENIPUNCTURE: CPT

## 2024-05-16 PROCEDURE — 85027 COMPLETE CBC AUTOMATED: CPT

## 2024-05-21 ENCOUNTER — OFFICE VISIT (OUTPATIENT)
Dept: PRIMARY CARE | Facility: CLINIC | Age: 74
End: 2024-05-21
Payer: MEDICARE

## 2024-05-21 VITALS
HEIGHT: 72 IN | OXYGEN SATURATION: 98 % | DIASTOLIC BLOOD PRESSURE: 80 MMHG | HEART RATE: 76 BPM | BODY MASS INDEX: 27.87 KG/M2 | WEIGHT: 205.8 LBS | SYSTOLIC BLOOD PRESSURE: 122 MMHG

## 2024-05-21 DIAGNOSIS — C61 PROSTATE CANCER (MULTI): ICD-10-CM

## 2024-05-21 DIAGNOSIS — Z13.89 SCREENING FOR MULTIPLE CONDITIONS: ICD-10-CM

## 2024-05-21 DIAGNOSIS — Z00.00 MEDICARE ANNUAL WELLNESS VISIT, SUBSEQUENT: Primary | ICD-10-CM

## 2024-05-21 DIAGNOSIS — J30.1 SEASONAL ALLERGIC RHINITIS DUE TO POLLEN: ICD-10-CM

## 2024-05-21 DIAGNOSIS — M17.0 OSTEOARTHRITIS OF BOTH KNEES, UNSPECIFIED OSTEOARTHRITIS TYPE: ICD-10-CM

## 2024-05-21 DIAGNOSIS — I10 PRIMARY HYPERTENSION: ICD-10-CM

## 2024-05-21 DIAGNOSIS — E03.9 ACQUIRED HYPOTHYROIDISM: ICD-10-CM

## 2024-05-21 DIAGNOSIS — R73.03 PRE-DIABETES: ICD-10-CM

## 2024-05-21 DIAGNOSIS — N40.0 BPH WITHOUT OBSTRUCTION/LOWER URINARY TRACT SYMPTOMS: ICD-10-CM

## 2024-05-21 DIAGNOSIS — Z87.891 FORMER SMOKER: ICD-10-CM

## 2024-05-21 DIAGNOSIS — Z13.6 SCREENING FOR AAA (ABDOMINAL AORTIC ANEURYSM): ICD-10-CM

## 2024-05-21 DIAGNOSIS — Z13.220 SCREENING FOR HYPERLIPIDEMIA: ICD-10-CM

## 2024-05-21 DIAGNOSIS — R39.12 WEAK URINARY STREAM: ICD-10-CM

## 2024-05-21 DIAGNOSIS — Z71.89 CARDIAC RISK COUNSELING: ICD-10-CM

## 2024-05-21 DIAGNOSIS — Z00.00 ANNUAL PHYSICAL EXAM: ICD-10-CM

## 2024-05-21 DIAGNOSIS — E78.9 BORDERLINE HIGH CHOLESTEROL: ICD-10-CM

## 2024-05-21 PROCEDURE — 1159F MED LIST DOCD IN RCRD: CPT | Performed by: FAMILY MEDICINE

## 2024-05-21 PROCEDURE — 1123F ACP DISCUSS/DSCN MKR DOCD: CPT | Performed by: FAMILY MEDICINE

## 2024-05-21 PROCEDURE — G0446 INTENS BEHAVE THER CARDIO DX: HCPCS | Performed by: FAMILY MEDICINE

## 2024-05-21 PROCEDURE — 99214 OFFICE O/P EST MOD 30 MIN: CPT | Performed by: FAMILY MEDICINE

## 2024-05-21 PROCEDURE — 1170F FXNL STATUS ASSESSED: CPT | Performed by: FAMILY MEDICINE

## 2024-05-21 PROCEDURE — 3074F SYST BP LT 130 MM HG: CPT | Performed by: FAMILY MEDICINE

## 2024-05-21 PROCEDURE — 1036F TOBACCO NON-USER: CPT | Performed by: FAMILY MEDICINE

## 2024-05-21 PROCEDURE — 3079F DIAST BP 80-89 MM HG: CPT | Performed by: FAMILY MEDICINE

## 2024-05-21 PROCEDURE — G0439 PPPS, SUBSEQ VISIT: HCPCS | Performed by: FAMILY MEDICINE

## 2024-05-21 PROCEDURE — G0444 DEPRESSION SCREEN ANNUAL: HCPCS | Performed by: FAMILY MEDICINE

## 2024-05-21 PROCEDURE — 1160F RVW MEDS BY RX/DR IN RCRD: CPT | Performed by: FAMILY MEDICINE

## 2024-05-21 RX ORDER — CELECOXIB 200 MG/1
200 CAPSULE ORAL DAILY
Qty: 90 CAPSULE | Refills: 1 | Status: SHIPPED | OUTPATIENT
Start: 2024-05-21

## 2024-05-21 RX ORDER — TAMSULOSIN HYDROCHLORIDE 0.4 MG/1
0.4 CAPSULE ORAL DAILY
Qty: 90 CAPSULE | Refills: 1 | Status: SHIPPED | OUTPATIENT
Start: 2024-05-21

## 2024-05-21 RX ORDER — TRIAMTERENE AND HYDROCHLOROTHIAZIDE 75; 50 MG/1; MG/1
1 TABLET ORAL
Qty: 90 TABLET | Refills: 1 | Status: SHIPPED | OUTPATIENT
Start: 2024-05-21

## 2024-05-21 RX ORDER — HYDRALAZINE HYDROCHLORIDE 50 MG/1
50 TABLET, FILM COATED ORAL 2 TIMES DAILY
Qty: 180 TABLET | Refills: 1 | Status: SHIPPED | OUTPATIENT
Start: 2024-05-21

## 2024-05-21 RX ORDER — POTASSIUM CHLORIDE 750 MG/1
10 TABLET, FILM COATED, EXTENDED RELEASE ORAL
Qty: 90 TABLET | Refills: 11 | Status: SHIPPED | OUTPATIENT
Start: 2024-05-21

## 2024-05-21 RX ORDER — CLONIDINE 0.3 MG/24H
1 PATCH, EXTENDED RELEASE TRANSDERMAL
Qty: 12 PATCH | Refills: 1 | Status: SHIPPED | OUTPATIENT
Start: 2024-05-21

## 2024-05-21 RX ORDER — ATORVASTATIN CALCIUM 10 MG/1
10 TABLET, FILM COATED ORAL DAILY
Qty: 90 TABLET | Refills: 1 | Status: SHIPPED | OUTPATIENT
Start: 2024-05-21

## 2024-05-21 RX ORDER — TERAZOSIN 2 MG/1
2 CAPSULE ORAL NIGHTLY
Qty: 90 CAPSULE | Refills: 1 | Status: SHIPPED | OUTPATIENT
Start: 2024-05-21

## 2024-05-21 ASSESSMENT — PATIENT HEALTH QUESTIONNAIRE - PHQ9
9. THOUGHTS THAT YOU WOULD BE BETTER OFF DEAD, OR OF HURTING YOURSELF: NOT AT ALL
5. POOR APPETITE OR OVEREATING: NOT AT ALL
SUM OF ALL RESPONSES TO PHQ QUESTIONS 1-9: 0
7. TROUBLE CONCENTRATING ON THINGS, SUCH AS READING THE NEWSPAPER OR WATCHING TELEVISION: NOT AT ALL
4. FEELING TIRED OR HAVING LITTLE ENERGY: NOT AT ALL
2. FEELING DOWN, DEPRESSED OR HOPELESS: NOT AT ALL
SUM OF ALL RESPONSES TO PHQ9 QUESTIONS 1 AND 2: 0
10. IF YOU CHECKED OFF ANY PROBLEMS, HOW DIFFICULT HAVE THESE PROBLEMS MADE IT FOR YOU TO DO YOUR WORK, TAKE CARE OF THINGS AT HOME, OR GET ALONG WITH OTHER PEOPLE: NOT DIFFICULT AT ALL
1. LITTLE INTEREST OR PLEASURE IN DOING THINGS: NOT AT ALL
8. MOVING OR SPEAKING SO SLOWLY THAT OTHER PEOPLE COULD HAVE NOTICED. OR THE OPPOSITE, BEING SO FIGETY OR RESTLESS THAT YOU HAVE BEEN MOVING AROUND A LOT MORE THAN USUAL: NOT AT ALL
3. TROUBLE FALLING OR STAYING ASLEEP OR SLEEPING TOO MUCH: NOT AT ALL
6. FEELING BAD ABOUT YOURSELF - OR THAT YOU ARE A FAILURE OR HAVE LET YOURSELF OR YOUR FAMILY DOWN: NOT AT ALL

## 2024-05-21 ASSESSMENT — ACTIVITIES OF DAILY LIVING (ADL)
GROCERY_SHOPPING: INDEPENDENT
DRESSING: INDEPENDENT
DRESSING: INDEPENDENT
TAKING_MEDICATION: INDEPENDENT
MANAGING_FINANCES: INDEPENDENT
BATHING: INDEPENDENT
DOING_HOUSEWORK: INDEPENDENT
BATHING: INDEPENDENT

## 2024-05-21 ASSESSMENT — ANXIETY QUESTIONNAIRES
IF YOU CHECKED OFF ANY PROBLEMS ON THIS QUESTIONNAIRE, HOW DIFFICULT HAVE THESE PROBLEMS MADE IT FOR YOU TO DO YOUR WORK, TAKE CARE OF THINGS AT HOME, OR GET ALONG WITH OTHER PEOPLE: NOT DIFFICULT AT ALL
2. NOT BEING ABLE TO STOP OR CONTROL WORRYING: NOT AT ALL
GAD7 TOTAL SCORE: 0
3. WORRYING TOO MUCH ABOUT DIFFERENT THINGS: NOT AT ALL
5. BEING SO RESTLESS THAT IT IS HARD TO SIT STILL: NOT AT ALL
6. BECOMING EASILY ANNOYED OR IRRITABLE: NOT AT ALL
7. FEELING AFRAID AS IF SOMETHING AWFUL MIGHT HAPPEN: NOT AT ALL
4. TROUBLE RELAXING: NOT AT ALL
1. FEELING NERVOUS, ANXIOUS, OR ON EDGE: NOT AT ALL

## 2024-05-21 ASSESSMENT — ENCOUNTER SYMPTOMS
OCCASIONAL FEELINGS OF UNSTEADINESS: 0
LOSS OF SENSATION IN FEET: 0
DEPRESSION: 0

## 2024-05-21 NOTE — PATIENT INSTRUCTIONS

## 2024-05-21 NOTE — ASSESSMENT & PLAN NOTE
BP controlled.  Continue current regimen.  Follow up 6 months, sooner with any problems or concerns.

## 2024-05-21 NOTE — ASSESSMENT & PLAN NOTE
Continue with OTC Flonase and discussed addition of over-the-counter Astelin during seasonal flares.  Call if symptoms are not controlled.

## 2024-05-21 NOTE — ASSESSMENT & PLAN NOTE
Finished last round of treatments.  Currently in remission.  Follow up with urology and oncology as scheduled.  Call with any problems or concerns.

## 2024-05-21 NOTE — ASSESSMENT & PLAN NOTE
Stable on as needed Celebrex.  Continue at current dosage.    Monitoring renal functions through his blood work.  Follow-up at least annually

## 2024-05-21 NOTE — ASSESSMENT & PLAN NOTE
Glucose levels slightly worsening, although hemoglobin A1c 5.8 %  Discussed possible addition of metformin, will opt for diet and exercise modifications for now.  Follow-up annually with ophthalmology, following for mild cataracts currently.  Repeat labs in 6 months prior to his next visit.

## 2024-05-21 NOTE — PROGRESS NOTES
Subjective   Reason for Visit: Anthony Henry is an 73 y.o. male here for a Medicare Wellness visit.     HPI    Review of other specialists:  DERM: Dr. Melissa Siddiqui - did freeze of small lesion  Urology: Dr. Alba  Radiation Oncology: Dr. Iqbal  Opth: [facility] in [place] - early cataracts  Audiology: [facility] - 25% hearing loss; working thorough hearing aid process    SOC Hx: ; retired   Tobacco Use: Medium Risk (2024)    Patient History     Smoking Tobacco Use: Former     Smokeless Tobacco Use: Never     Passive Exposure: Not on file     Alcohol Use: Not At Risk (2024)    AUDIT-C     Frequency of Alcohol Consumption: Never     Average Number of Drinks: Patient does not drink     Frequency of Binge Drinking: Never       DIET: general    EXERCISE:  active lifestyle; helps out with grand kids during the school year and watches their dog during the day    ELIMINATION: no constipation or diarrhea  Colon CA Screenin2021, repeat due this year    URINARY SYSTEM: urinary frequency  Prostate Specific AG   Date Value Ref Range Status   2023 6.14 (H) <=4.00 ng/mL Final     PSA, Free   Date Value Ref Range Status   2020 0.4 ng/mL Final     PSA, Free Pct   Date Value Ref Range Status   2020 9 % Final     Comment:     INTERPRETIVE INFORMATION: Prostate Specific Antigen, Free   Percentage  [facility] uses the Roche Free PSA electrochemiluminescent immunoassay   method in conjunction with the Roche PSA electrochemiluminescent   immunoassay method to determine the free PSA percentage. Values   obtained with different assay methods should not be used   interchangeably. The free PSA percentage is an aid in   distinguishing prostate cancer from benign prostatic conditions in   men age 50 and older with a total PSA between 3 and 10 ng/mL and   negative digital rectal examination findings. Prostatic biopsy is   required for the diagnosis of cancer.  In patients with total PSA  concentrations of 4-10 ng/mL, the   probability of finding prostate cancer on needle biopsy by age in   years is:  %fPSA               50-59    60-69    70 or older  0  - 10%            49%      58%      65%  11 - 18%            27%      34%      41%  19 - 25%            18%      24%      30%  Greater than 25%     9%      12%      16%  Other factors may help determine the actual risk of prostate   cancer in individual patients.  Performed By: [employer]  [address] [place] [id number]  : Abigail Pichardo MD         SLEEP: disturbed waking to void and difficulty falling back asleep   Melatonin    MOODS: no concerning symptoms  Patient Health Questionnaire-9 Score: 0    JASBIR-7 Total Score: 0        Patient Care Team:  Lindsay Bone DO as PCP - General (Family Medicine)  Lindsay Bone DO as PCP - [employer] Advantage PCP  Anastasiia Cristina LPN as Care Manager (Case Management)  Elissa Iqbal MD PhD as Radiation Oncologist (Radiation Oncology)  Shmuel Alba MD as Surgeon (Urology)  Melissa Siddiqui DO as Consulting Physician (Dermatology)     Review of Systems    Objective   Vitals:  /80 (BP Location: Left arm, Patient Position: Sitting, BP Cuff Size: Adult)   Pulse 76   Ht 1.829 m (6')   Wt 93.4 kg (205 lb 12.8 oz)   SpO2 98%   BMI 27.91 kg/m²       Physical Exam  Constitutional:       General: He is not in acute distress.     Appearance: Normal appearance.   HENT:      Head: Normocephalic and atraumatic.      Right Ear: Tympanic membrane, ear canal and external ear normal.      Left Ear: Tympanic membrane, ear canal and external ear normal.      Nose: Nose normal.      Mouth/Throat:      Mouth: Mucous membranes are moist.      Pharynx: No oropharyngeal exudate or posterior oropharyngeal erythema.   Eyes:      Extraocular Movements: Extraocular movements intact.      Conjunctiva/sclera: Conjunctivae normal.      Pupils: Pupils are equal, round, and reactive to  light.   Cardiovascular:      Rate and Rhythm: Normal rate and regular rhythm.      Heart sounds: No murmur heard.  Pulmonary:      Effort: Pulmonary effort is normal.      Breath sounds: Normal breath sounds.   Abdominal:      General: Bowel sounds are normal.      Palpations: Abdomen is soft.   Musculoskeletal:         General: Normal range of motion.      Cervical back: No rigidity.   Lymphadenopathy:      Cervical: No cervical adenopathy.   Skin:     General: Skin is warm and dry.      Findings: No rash.   Neurological:      General: No focal deficit present.      Mental Status: He is alert and oriented to person, place, and time.      Cranial Nerves: No cranial nerve deficit.      Gait: Gait normal.   Psychiatric:         Mood and Affect: Mood normal.         Behavior: Behavior normal.         Assessment/Plan   Problem List Items Addressed This Visit       BPH without obstruction/lower urinary tract symptoms    Relevant Medications    terazosin (Hytrin) 2 mg capsule    Osteoarthritis of knees, bilateral     Stable on as needed Celebrex.  Continue at current dosage.    Monitoring renal functions through his blood work.  Follow-up at least annually         Relevant Medications    celecoxib (CeleBREX) 200 mg capsule    Primary hypertension     BP controlled.  Continue current regimen.  Follow up 6 months, sooner with any problems or concerns.         Relevant Medications    cloNIDine (Catapres-TTS) 0.3 mg/24 hr patch    hydrALAZINE (Apresoline) 50 mg tablet    triamterene-hydrochlorothiazid (Maxzide) 75-50 mg tablet    potassium chloride CR 10 mEq ER tablet    Seasonal allergic rhinitis due to pollen     Continue with OTC Flonase and discussed addition of over-the-counter Astelin during seasonal flares.  Call if symptoms are not controlled.         Medicare annual wellness visit, subsequent - Primary    Pre-diabetes     Glucose levels slightly worsening, although hemoglobin A1c 5.8 %  Discussed possible addition  of metformin, will opt for diet and exercise modifications for now.  Follow-up annually with ophthalmology, following for mild cataracts currently.  Repeat labs in 6 months prior to his next visit.         Borderline high cholesterol    Relevant Medications    atorvastatin (Lipitor) 10 mg tablet    Other Relevant Orders    Vascular US abdominal aorta anuerysm AAA screening    Prostate cancer (Multi)     Finished last round of treatments.  Currently in remission.  Follow up with urology and oncology as scheduled.  Call with any problems or concerns.          Other Visit Diagnoses       Screening for multiple conditions        Depression Screening    Screening for multiple conditions        Former smoker        Relevant Orders    Vascular US abdominal aorta anuerysm AAA screening    Cardiac risk counseling        See ASCVD risk calculation.    Screening for hyperlipidemia        Weak urinary stream        Relevant Medications    tamsulosin (Flomax) 0.4 mg 24 hr capsule    Screening for AAA (abdominal aortic aneurysm)        Relevant Orders    Vascular US abdominal aorta anuerysm AAA screening               Cardiovascular risk was discussed and if needed lifestyle modifications recommended, including nutritional choices, exercise and elimination of habits contributing to risk.  We agreed on a plan to reduce the current cardiovascular risk based on above discussion as needed.    In general, low dose ASA should be considered:  In patients WITHOUT prior MI/stroke/PAD (primary prevention):   A. Age <60: Use if 10-year cardiovascular disease risk >20%, with discussion of risks and benefits with patient.   B. Age 60 - 70: Use if 10-year cardiovascular disease risk >2% and low bleeding (e.g. gastrointestinal) risk, with discussion of risks and benefits with patient.   C.  Age > = 70: Do not use.    In patients WITH prior IM/stroke/PAD (secondary prevention):  Generally use unless extremely high bleeding (e.g.  gastrointestinal) risk, with discussion of risks and benefits with patient.    The 10-year ASCVD risk score (Carmen RICHMOND, et al., 2019) is: 22.6%    Values used to calculate the score:      Age: 73 years      Sex: Male      Is Non- : No      Diabetic: No      Tobacco smoker: No      Systolic Blood Pressure: 122 mmHg      Is BP treated: Yes      HDL Cholesterol: 45 mg/dL      Total Cholesterol: 165 mg/dL      Depression screening completed using the PHQ-2 questions with results documented in the chart/encounter (15 min) with treatment plan and follow up as needed.  See Rooming Screening section for documentation, and/or progress note for additional information.       Medicare Wellness Billing Compliance Satisfied    *This is a visual tool to show completion of required items on the day of the visit. Green checks will only appear on the date of visit.    Review all medications by prescribing practitioner or clinical pharmacist (such as prescriptions, OTCs, herbal therapies and supplements) documented in the medical record    Past Medical, Surgical, and Family History reviewed and updated in chart    Tobacco Use Reviewed    Alcohol Use Reviewed    Illicit Drug Use Reviewed    PHQ2/9    Falls in Last Year Reviewed    Home Safety Risk Factors Reviewed    Cognitive Impairment Reviewed    Patient Self Assessment and Health Status    Current Diet Reviewed    Exercise Frequency    ADL - Hearing Impairment    ADL - Bathing    ADL - Dressing    ADL - Walks in Home    IADL - Managing Finances    IADL - Grocery Shopping    IADL - Taking Medications    IADL - Doing Housework         Patient was identified as a fall risk.   Fall was related to walking up bleachers, otherwise steady on his feet - low risk for recurring falls after assessment today.  Risk prevention encouraged.    FOLLOW UP 6 MOTHS FOR CPE AND REPEAT LABS.

## 2024-05-24 ENCOUNTER — PATIENT OUTREACH (OUTPATIENT)
Dept: CARE COORDINATION | Facility: CLINIC | Age: 74
End: 2024-05-24
Payer: MEDICARE

## 2024-06-06 ENCOUNTER — HOSPITAL ENCOUNTER (OUTPATIENT)
Dept: VASCULAR MEDICINE | Facility: CLINIC | Age: 74
Discharge: HOME | End: 2024-06-06
Payer: MEDICARE

## 2024-06-06 DIAGNOSIS — E78.9 BORDERLINE HIGH CHOLESTEROL: ICD-10-CM

## 2024-06-06 DIAGNOSIS — I70.0 ATHEROSCLEROSIS OF AORTA (CMS-HCC): ICD-10-CM

## 2024-06-06 DIAGNOSIS — Z87.891 FORMER SMOKER: ICD-10-CM

## 2024-06-06 DIAGNOSIS — Z13.6 SCREENING FOR AAA (ABDOMINAL AORTIC ANEURYSM): ICD-10-CM

## 2024-06-06 PROCEDURE — 76706 US ABDL AORTA SCREEN AAA: CPT | Performed by: SURGERY

## 2024-06-06 PROCEDURE — 76706 US ABDL AORTA SCREEN AAA: CPT

## 2024-06-13 ENCOUNTER — APPOINTMENT (OUTPATIENT)
Dept: RADIATION ONCOLOGY | Facility: HOSPITAL | Age: 74
End: 2024-06-13
Payer: MEDICARE

## 2024-06-21 ENCOUNTER — TELEPHONE (OUTPATIENT)
Dept: RADIATION ONCOLOGY | Facility: HOSPITAL | Age: 74
End: 2024-06-21
Payer: MEDICARE

## 2024-06-24 ENCOUNTER — LAB (OUTPATIENT)
Dept: LAB | Facility: LAB | Age: 74
End: 2024-06-24
Payer: MEDICARE

## 2024-06-24 DIAGNOSIS — C61 PROSTATE CANCER (MULTI): ICD-10-CM

## 2024-06-24 LAB — PSA SERPL-MCNC: 0.74 NG/ML

## 2024-06-24 PROCEDURE — 84153 ASSAY OF PSA TOTAL: CPT

## 2024-06-24 PROCEDURE — 36415 COLL VENOUS BLD VENIPUNCTURE: CPT

## 2024-06-25 NOTE — PROGRESS NOTES
"Cancer synopsis:  Rad/onc: Dr. Iqbal/[facility] LOVE    73 y.o. male with Prostate cancer (CMS/HCC), Clinical: cT1c, cN0, PSA: 6.6, Grade Group: 2.      8/31/2023 6.6     12/7/2023 PSA 6.14     12/14/2023 MRI prostate, compared to 2/16/2023, noted enlargement of the PI-RADS 5 lesion in the left anterior lateral TZ with abutment of the anterior capsule without PILI.  No SVI or abnormal lymphadenopathy.    SBRT to prostate and SV 02/2024    PMH:  Hypothyroidism, HTN, OA, BPH, pre-diabetes    Recent imaging:  Vascular US abd 06/2024: AAA negative  Ct chest avd/lumbar/thoracic spine/ cervical spine and ct head 02/25/2024: Acute, nondisplaced fractures of the left posterior 5th through 8th ribs. Right lobe thyroid nodules measuring up to 2.7 cm.    History of presenting illness:    Patient ID: [id number]     Anthony Henry \"Romario" is a 73 y.o. male who presents for his FIR prostate cancer cT1c, cN0, PSA: 6.6, Grade Group: 2 now s/p RT alone.    RT Site: prostate and SV  RT Date: 02/29/2024  Hormone therapy: No  Hot Flushes: Denies  Fatigue: Denies  Bone pain: Admits to pain located at site of non displaced fractures that occurs intermittently but manageable per patient.  ED: Denies changes since RT  - Quality of erections during the last 4 weeks: 3 = Firm enough for masturbation and foreplay only  - Use of erectile dysfunction medications:  None  IPSS: 12  Urinary symptoms: reports nocturia two times a night. Denies weak stream, frequency, urgency or urine leakage. Reports urination is now back to baseline prior to RT.  Urinary Medications: No  Rectal bleeding: Denies  Colonoscopy: 07/2021 multiple polyps removed, diverticula noted. Intenral and external hemmoirds noted. Next due now  Other systems: Denies SOB, CP or fever    Review of systems:  Review of Systems   Constitutional:  Negative for fatigue, fever and unexpected weight change.   Respiratory:  Negative for cough, chest tightness and shortness of breath.  "   Cardiovascular:  Negative for chest pain, palpitations and leg swelling.   Gastrointestinal:  Negative for abdominal pain, anal bleeding, blood in stool, constipation, diarrhea and rectal pain.   Endocrine: Negative for cold intolerance, heat intolerance and polyuria.   Genitourinary:  Negative for decreased urine volume, difficulty urinating, dysuria, frequency, hematuria and urgency.   Musculoskeletal:  Negative for arthralgias, back pain, gait problem and joint swelling.        Reports some residual pain of ribs r/t rcent fall and non displaced fractures   Skin: Negative.    Allergic/Immunologic: Negative.    Neurological:  Negative for dizziness, syncope and weakness.   Psychiatric/Behavioral: Negative.         PERFORMANCE STATUS:  KPS/ECO, Normal activity with effort; some signs or symptoms of disease (ECOG equivalent 1)    Past Medical history  Past Medical History:   Diagnosis Date    Arthritis     BPH (benign prostatic hyperplasia)     Cataract     Colon polyp     Hemorrhoids     HL (hearing loss)     Hypertension     Personal history of other diseases of the circulatory system     History of hypertension    Personal history of other endocrine, nutritional and metabolic disease     History of diabetes mellitus    Prostate cancer (Multi) 2023    Rhinitis     Subluxation     spine        Surgical/family history  Family History   Problem Relation Name Age of Onset    Hypertension Mother Kristal     Bilateral breast cancer Mother Kristal     Colon cancer Mother Kristal     Other (htn) Mother Kristal     Cancer Mother Kristal     Brain Aneurysm Mother Kristal     Parkinsonism Father      Parkinsonism Brother        Past Surgical History:   Procedure Laterality Date    COLONOSCOPY      HERNIA REPAIR Right     KNEE ARTHROSCOPY W/ DEBRIDEMENT  2013    Knee Arthroscopy (Therapeutic)    SHOULDER Left     RCR    TONSILLECTOMY      TOTAL KNEE ARTHROPLASTY Bilateral         Social  History  Tobacco Use: Medium Risk (5/21/2024)    Patient History     Smoking Tobacco Use: Former     Smokeless Tobacco Use: Never     Passive Exposure: Not on file         Current med list:  Current Outpatient Medications   Medication Instructions    acetaminophen (TYLENOL 8 HOUR) 650 mg, oral, Every 8 hours PRN, Do not crush, chew, or split.    atorvastatin (LIPITOR) 10 mg, oral, Daily    celecoxib (CELEBREX) 200 mg, oral, Daily    cloNIDine (Catapres-TTS) 0.3 mg/24 hr patch 1 patch, transdermal, Once Weekly, On Sundays    cyanocobalamin (vitamin B-12) 5,000 mcg, oral, Every morning    hydrALAZINE (APRESOLINE) 50 mg, oral, 2 times daily    multivitamin tablet 1 tablet, oral, Daily    potassium chloride CR 10 mEq ER tablet 10 mEq, oral, Daily RT, Take a half a tablet (5 meq)    tamsulosin (FLOMAX) 0.4 mg, oral, Daily    terazosin (HYTRIN) 2 mg, oral, Nightly    triamterene-hydrochlorothiazid (Maxzide) 75-50 mg tablet 1 tablet, oral, Daily RT        Last recorded vital:  /81   Pulse 87   Temp 36.5 °C (97.7 °F) (Temporal)   Resp 18   Wt 95.3 kg (210 lb 1.6 oz)   SpO2 96%   BMI 28.49 kg/m²     Physical exam  Physical Exam  Constitutional:       Appearance: Normal appearance.   Cardiovascular:      Rate and Rhythm: Normal rate.   Pulmonary:      Effort: Pulmonary effort is normal.      Breath sounds: Normal breath sounds.   Musculoskeletal:         General: Normal range of motion.      Cervical back: Normal range of motion.   Neurological:      Mental Status: He is alert and oriented to person, place, and time.   Psychiatric:         Mood and Affect: Mood normal.         Behavior: Behavior normal.         Thought Content: Thought content normal.         Judgment: Judgment normal.         Pertinent labs:  Prostate Specific AG   Date/Time Value Ref Range Status   12/07/2023 10:36 AM 6.14 (H) <=4.00 ng/mL Final       Dx:  Problem List Items Addressed This Visit       Prostate cancer (Multi)    Relevant Orders     Prostate Specific Antigen     Other Visit Diagnoses       Malignant neoplasm of prostate (Multi)        Relevant Orders    Clinic Appointment Request Follow Up; [facility] (virtual); [facility] [facility] (virtual)    Thyroid nodule greater than or equal to 1.5 cm in diameter incidentally noted on imaging study        Relevant Orders    Referral to ENT         PSA of 0.74 was reviewed and is declining nicely. Review of latent SE including rectal bleeding, hematuria, urinary strictures, ED where reviewed as well as how to contact office if s/s present. Denies latent SE. NCCN guidelines where reviewed and routine FUV of every 3m for first year and every 6m for four years for a total of five years was discussed. Patient verbalized understanding.     R thyroid nodule work up with Dr. Zamora. Discussed finding was noted on recent Ct and was incidental. Reviewed anything over 1cm per guidelines is advised to have further evaluation.    PLAN:  FUV 4m  Labs PSA in 4m  Imaging none  Referral to dr zamora for endocrine nodule  FUV other providers: PCP for routine evals    Please contact office with any concerns:  [place] [facility]  [phone]

## 2024-06-26 ENCOUNTER — HOSPITAL ENCOUNTER (OUTPATIENT)
Dept: RADIATION ONCOLOGY | Facility: HOSPITAL | Age: 74
Setting detail: RADIATION/ONCOLOGY SERIES
Discharge: HOME | End: 2024-06-26
Payer: MEDICARE

## 2024-06-26 VITALS
OXYGEN SATURATION: 96 % | SYSTOLIC BLOOD PRESSURE: 131 MMHG | DIASTOLIC BLOOD PRESSURE: 81 MMHG | TEMPERATURE: 97.7 F | WEIGHT: 210.1 LBS | BODY MASS INDEX: 28.49 KG/M2 | HEART RATE: 87 BPM | RESPIRATION RATE: 18 BRPM

## 2024-06-26 DIAGNOSIS — E04.1 THYROID NODULE GREATER THAN OR EQUAL TO 1.5 CM IN DIAMETER INCIDENTALLY NOTED ON IMAGING STUDY: ICD-10-CM

## 2024-06-26 DIAGNOSIS — C61 MALIGNANT NEOPLASM OF PROSTATE (MULTI): ICD-10-CM

## 2024-06-26 DIAGNOSIS — C61 PROSTATE CANCER (MULTI): ICD-10-CM

## 2024-06-26 PROCEDURE — 99215 OFFICE O/P EST HI 40 MIN: CPT

## 2024-06-26 PROCEDURE — 99214 OFFICE O/P EST MOD 30 MIN: CPT

## 2024-06-26 ASSESSMENT — ENCOUNTER SYMPTOMS
DIZZINESS: 0
ABDOMINAL PAIN: 0
CONSTIPATION: 0
ANAL BLEEDING: 0
COUGH: 0
BACK PAIN: 0
FATIGUE: 0
HEMATURIA: 0
PSYCHIATRIC NEGATIVE: 1
BLOOD IN STOOL: 0
SHORTNESS OF BREATH: 0
RECTAL PAIN: 0
CHEST TIGHTNESS: 0
ALLERGIC/IMMUNOLOGIC NEGATIVE: 1
UNEXPECTED WEIGHT CHANGE: 0
WEAKNESS: 0
DIARRHEA: 0
FEVER: 0
ARTHRALGIAS: 0
OCCASIONAL FEELINGS OF UNSTEADINESS: 0
DYSURIA: 0
PALPITATIONS: 0
FREQUENCY: 0
JOINT SWELLING: 0
DEPRESSION: 0
LOSS OF SENSATION IN FEET: 0
DIFFICULTY URINATING: 0

## 2024-06-26 ASSESSMENT — COLUMBIA-SUICIDE SEVERITY RATING SCALE - C-SSRS
2. HAVE YOU ACTUALLY HAD ANY THOUGHTS OF KILLING YOURSELF?: NO
1. IN THE PAST MONTH, HAVE YOU WISHED YOU WERE DEAD OR WISHED YOU COULD GO TO SLEEP AND NOT WAKE UP?: NO
6. HAVE YOU EVER DONE ANYTHING, STARTED TO DO ANYTHING, OR PREPARED TO DO ANYTHING TO END YOUR LIFE?: NO

## 2024-06-26 ASSESSMENT — PATIENT HEALTH QUESTIONNAIRE - PHQ9
SUM OF ALL RESPONSES TO PHQ9 QUESTIONS 1 AND 2: 0
2. FEELING DOWN, DEPRESSED OR HOPELESS: NOT AT ALL
1. LITTLE INTEREST OR PLEASURE IN DOING THINGS: NOT AT ALL

## 2024-06-26 ASSESSMENT — PAIN SCALES - GENERAL: PAINLEVEL: 0-NO PAIN

## 2024-08-05 DIAGNOSIS — Z86.010 HISTORY OF COLONIC POLYPS: ICD-10-CM

## 2024-08-05 RX ORDER — POLYETHYLENE GLYCOL 3350, SODIUM CHLORIDE, SODIUM BICARBONATE, POTASSIUM CHLORIDE 420; 11.2; 5.72; 1.48 G/4L; G/4L; G/4L; G/4L
POWDER, FOR SOLUTION ORAL
Qty: 4000 ML | Refills: 0 | Status: SHIPPED | OUTPATIENT
Start: 2024-08-05

## 2024-08-14 DIAGNOSIS — Z86.010 HISTORY OF COLONIC POLYPS: ICD-10-CM

## 2024-08-14 RX ORDER — SOD SULF/POT CHLORIDE/MAG SULF 1.479 G
TABLET ORAL
Qty: 24 TABLET | Refills: 0 | Status: SHIPPED | OUTPATIENT
Start: 2024-08-14

## 2024-08-20 ENCOUNTER — APPOINTMENT (OUTPATIENT)
Dept: GASTROENTEROLOGY | Facility: EXTERNAL LOCATION | Age: 74
End: 2024-08-20
Payer: MEDICARE

## 2024-08-20 DIAGNOSIS — Z86.010 PERSONAL HISTORY OF COLONIC POLYPS: ICD-10-CM

## 2024-08-20 DIAGNOSIS — Z12.11 SPECIAL SCREENING FOR MALIGNANT NEOPLASMS, COLON: Primary | ICD-10-CM

## 2024-08-20 PROCEDURE — 1123F ACP DISCUSS/DSCN MKR DOCD: CPT | Performed by: INTERNAL MEDICINE

## 2024-08-20 PROCEDURE — 45378 DIAGNOSTIC COLONOSCOPY: CPT | Performed by: INTERNAL MEDICINE

## 2024-09-10 NOTE — PROGRESS NOTES
Chief Complaint   Patient presents with    Mass     thyroid     HPI  Anthony Henry is a 74 y.o. male referred to me today by Eric Schmidt APRN-* for further evaluation and treatment of thyroid nodules. They were noted incidentally on a CT chest and abdomen he had 2/2024 for his history of prostate cancer. It showed right lobe thyroid nodules measuring up to 2.5 cm.  He has not yet had a dedicated thyroid US.  TSH from 5/16/24 was 1.03. Denies odynophagia or otalgia. He reports having some issues with dysphagia particularly to solid foods. This has been going on for a few months now. Tolerating a regular diet.  Denies weight loss.  No fevers/chills.  No night sweats. Denies voice changes. No history of radiation to the head or neck or radiation exposure. No family history of thyroid cancer.     Social History  He reports that he quit smoking about 11 years ago. His smoking use included cigarettes and cigars. He started smoking about 16 years ago. He has a 1.3 pack-year smoking history. He has never used smokeless tobacco. He reports current alcohol use of about 1.0 standard drink of alcohol per week. He reports that he does not use drugs.    PMH  He has a past medical history of Arthritis, BPH (benign prostatic hyperplasia), Cataract, Colon polyp, Hemorrhoids, HL (hearing loss), Hypertension, Personal history of other diseases of the circulatory system, Personal history of other endocrine, nutritional and metabolic disease, Prostate cancer (Multi) (03/2023), Rhinitis, and Subluxation.     PSH  He has a past surgical history that includes Knee arthroscopy w/ debridement (12/17/2013); Total knee arthroplasty (Bilateral, 2022); XR shoulder (Left, 2017); Tonsillectomy; Hernia repair (Right, 2004); and Colonoscopy.    ROS  Review of Systems   Constitutional:  Negative for appetite change, chills, fatigue, fever and unexpected weight change.   HENT:  Positive for trouble swallowing. Negative for dental problem,  drooling, ear pain, facial swelling, hearing loss, mouth sores, sore throat, tinnitus and voice change.    Respiratory:  Negative for cough, shortness of breath and stridor.    Gastrointestinal:  Negative for nausea and vomiting.   Musculoskeletal:  Negative for neck pain.   Hematological:  Negative for adenopathy.      PE  ENT Physical Exam  Constitutional  Appearance: patient appears well-developed, patient is cooperative;  Communication/Voice: communication appropriate for developmental age;  Head and Face  Appearance: head appears normal and face appears normal;  Ear  Auricles: right auricle normal; left auricle normal;  Ear Canals: right ear canal normal; left ear canal normal;  Tympanic Membranes: right tympanic membrane normal; left tympanic membrane normal;  Nose  External Nose: nares patent bilaterally; external nose normal;  Internal Nose: nasal mucosa normal; septum normal;  Oral Cavity/Oropharynx  Lips: normal;  Gums: gingiva normal;  Tongue: normal;  Oral mucosa: normal;  Neck  Neck: neck normal;  Thyroid: nodules present on right lobe;  Respiratory  Inspection: breathing unlabored;  Cardiovascular  Inspection: extremities are warm and well perfused;  Lymphatic  Palpation: no cervical adenopathy noted;  Neurovestibular  Mental Status: alert and oriented;  Psychiatric: mood normal; affect is appropriate;  Cranial Nerves: cranial nerves intact;       Procedures     ASSESSMENT AND PLAN  Problem List Items Addressed This Visit       Thyroid nodule greater than or equal to 1.5 cm in diameter incidentally noted on imaging study    Current Assessment & Plan     I did explain the diagnosis of thyroid nodule and the plans for evaluation and treatment of the problem.    This was found incidentally  I recommend thyroid US and then we can review if a US guided FNA is needed  I answered all of the patients questions.  They did appear to understand and confirmed this, and do agree to proceed as outlined above.           Relevant Orders    US thyroid (Completed)    Oropharyngeal dysphagia    Current Assessment & Plan     Recent difficulty swallowing  Still able to eat/drink  Will start with thyroid workup and then can consider if compressive symptoms versus swallow study is needed           Laura Soni MD    Head & Neck Surgical Oncology & Reconstruction  Department of Otolaryngology - Head and Neck Surgery     By signing my name below, I, Lou Guadarrama, attest that this documentation has been prepared under the direction and in the presence of Dr. Laura Soni MD.     All medical record entries made by the Clementineibe were at my direction and personally dictated by me, Dr. Laura Soni. I have reviewed the chart and agree that the record accurately reflects my personal performance of the history, physical exam, discussion and plan.

## 2024-09-16 ENCOUNTER — APPOINTMENT (OUTPATIENT)
Dept: OTOLARYNGOLOGY | Facility: CLINIC | Age: 74
End: 2024-09-16
Payer: MEDICARE

## 2024-09-16 VITALS — TEMPERATURE: 98.6 F | WEIGHT: 210 LBS | HEIGHT: 71 IN | BODY MASS INDEX: 29.4 KG/M2

## 2024-09-16 DIAGNOSIS — R13.12 OROPHARYNGEAL DYSPHAGIA: ICD-10-CM

## 2024-09-16 DIAGNOSIS — E04.1 THYROID NODULE GREATER THAN OR EQUAL TO 1.5 CM IN DIAMETER INCIDENTALLY NOTED ON IMAGING STUDY: ICD-10-CM

## 2024-09-16 PROCEDURE — 1160F RVW MEDS BY RX/DR IN RCRD: CPT | Performed by: OTOLARYNGOLOGY

## 2024-09-16 PROCEDURE — 1123F ACP DISCUSS/DSCN MKR DOCD: CPT | Performed by: OTOLARYNGOLOGY

## 2024-09-16 PROCEDURE — 3008F BODY MASS INDEX DOCD: CPT | Performed by: OTOLARYNGOLOGY

## 2024-09-16 PROCEDURE — 99204 OFFICE O/P NEW MOD 45 MIN: CPT | Performed by: OTOLARYNGOLOGY

## 2024-09-16 PROCEDURE — 1159F MED LIST DOCD IN RCRD: CPT | Performed by: OTOLARYNGOLOGY

## 2024-09-16 ASSESSMENT — PATIENT HEALTH QUESTIONNAIRE - PHQ9
2. FEELING DOWN, DEPRESSED OR HOPELESS: NOT AT ALL
SUM OF ALL RESPONSES TO PHQ9 QUESTIONS 1 AND 2: 0
1. LITTLE INTEREST OR PLEASURE IN DOING THINGS: NOT AT ALL

## 2024-09-16 ASSESSMENT — ENCOUNTER SYMPTOMS
LOSS OF SENSATION IN FEET: 0
OCCASIONAL FEELINGS OF UNSTEADINESS: 0
DEPRESSION: 0

## 2024-09-19 ENCOUNTER — HOSPITAL ENCOUNTER (OUTPATIENT)
Dept: RADIOLOGY | Facility: CLINIC | Age: 74
Discharge: HOME | End: 2024-09-19
Payer: MEDICARE

## 2024-09-19 DIAGNOSIS — E04.1 THYROID NODULE GREATER THAN OR EQUAL TO 1.5 CM IN DIAMETER INCIDENTALLY NOTED ON IMAGING STUDY: ICD-10-CM

## 2024-09-19 PROCEDURE — 76536 US EXAM OF HEAD AND NECK: CPT | Performed by: RADIOLOGY

## 2024-09-19 PROCEDURE — 76536 US EXAM OF HEAD AND NECK: CPT

## 2024-09-25 ENCOUNTER — TELEPHONE (OUTPATIENT)
Dept: OTOLARYNGOLOGY | Facility: HOSPITAL | Age: 74
End: 2024-09-25
Payer: MEDICARE

## 2024-09-25 DIAGNOSIS — E04.1 THYROID NODULE GREATER THAN OR EQUAL TO 1.5 CM IN DIAMETER INCIDENTALLY NOTED ON IMAGING STUDY: ICD-10-CM

## 2024-09-25 ASSESSMENT — ENCOUNTER SYMPTOMS
SORE THROAT: 0
TROUBLE SWALLOWING: 1
NAUSEA: 0
ADENOPATHY: 0
SHORTNESS OF BREATH: 0
COUGH: 0
FACIAL SWELLING: 0
APPETITE CHANGE: 0
STRIDOR: 0
VOMITING: 0
CHILLS: 0
NECK PAIN: 0
FEVER: 0
UNEXPECTED WEIGHT CHANGE: 0
FATIGUE: 0
VOICE CHANGE: 0

## 2024-09-25 NOTE — TELEPHONE ENCOUNTER
I called and personally spoke with the patient. Discussed his thyroid US showed two thyroid nodules that meet criteria for bx. Orders placed for US FNA thyroid nodules. Patient to call IR for bx

## 2024-09-26 NOTE — ASSESSMENT & PLAN NOTE
Recent difficulty swallowing  Still able to eat/drink  Will start with thyroid workup and then can consider if compressive symptoms versus swallow study is needed

## 2024-09-26 NOTE — ASSESSMENT & PLAN NOTE
I did explain the diagnosis of thyroid nodule and the plans for evaluation and treatment of the problem.    This was found incidentally  I recommend thyroid US and then we can review if a US guided FNA is needed  I answered all of the patients questions.  They did appear to understand and confirmed this, and do agree to proceed as outlined above.

## 2024-10-01 ENCOUNTER — TELEPHONE (OUTPATIENT)
Dept: OTOLARYNGOLOGY | Facility: HOSPITAL | Age: 74
End: 2024-10-01
Payer: MEDICARE

## 2024-10-01 NOTE — TELEPHONE ENCOUNTER
Called to review thyroid US results, he has been in contact with my nurses and we have arranged for US guided FNA of his thyroid nodules but wanted to ensure he is satisfied with his answered and care.  No answer.  Left VM.      Laura Soni MD    Head & Neck Surgical Oncology & Reconstruction  Department of Otolaryngology - Head and Neck Surgery

## 2024-10-14 ENCOUNTER — HOSPITAL ENCOUNTER (OUTPATIENT)
Dept: RADIOLOGY | Facility: HOSPITAL | Age: 74
Discharge: HOME | End: 2024-10-14
Payer: MEDICARE

## 2024-10-14 VITALS
TEMPERATURE: 98.7 F | HEART RATE: 63 BPM | DIASTOLIC BLOOD PRESSURE: 70 MMHG | OXYGEN SATURATION: 95 % | RESPIRATION RATE: 16 BRPM | SYSTOLIC BLOOD PRESSURE: 130 MMHG

## 2024-10-14 DIAGNOSIS — E04.1 THYROID NODULE GREATER THAN OR EQUAL TO 1.5 CM IN DIAMETER INCIDENTALLY NOTED ON IMAGING STUDY: ICD-10-CM

## 2024-10-14 PROCEDURE — 88173 CYTOPATH EVAL FNA REPORT: CPT | Mod: TC,59 | Performed by: OTOLARYNGOLOGY

## 2024-10-14 PROCEDURE — 10006 FNA BX W/US GDN EA ADDL: CPT

## 2024-10-14 PROCEDURE — 2500000004 HC RX 250 GENERAL PHARMACY W/ HCPCS (ALT 636 FOR OP/ED): Performed by: NURSE PRACTITIONER

## 2024-10-14 PROCEDURE — 10005 FNA BX W/US GDN 1ST LES: CPT

## 2024-10-14 RX ORDER — LIDOCAINE HYDROCHLORIDE 10 MG/ML
INJECTION, SOLUTION EPIDURAL; INFILTRATION; INTRACAUDAL; PERINEURAL
Status: COMPLETED | OUTPATIENT
Start: 2024-10-14 | End: 2024-10-14

## 2024-10-14 NOTE — PRE-PROCEDURE NOTE
Interventional Radiology Preprocedure Note    Indication for procedure: The encounter diagnosis was Thyroid nodule greater than or equal to 1.5 cm in diameter incidentally noted on imaging study.    Relevant review of systems: NA    Relevant Labs:   Lab Results   Component Value Date    CREATININE 1.08 05/16/2024    EGFR 72 05/16/2024    INR 1.1 02/25/2024    PROTIME 12.3 02/25/2024       Planned Sedation/Anesthesia: local    Airway assessment: normal    Directed physical examination:    A&Ox3  Breathing Unlabored  Neck supple    US thyroid reviewed, two dominant nodules in the right thyroid, TR IV and TR III for US guided FNA today.     Benefits, risks and alternatives of procedure and planned sedation have been discussed with the patient and/or their representative. All questions answered and they agree to proceed.

## 2024-10-14 NOTE — POST-PROCEDURE NOTE
Interventional Radiology Brief Postprocedure Note    Attending: MARGARITO Martini    Assistant: none    Diagnosis: TR IV right superior and TR III right mid nodule    Description of procedure: A Fine Needle Aspiration was preformed of the 2.4cm nodule of the right upper lobe of the thyroid.  FNA was also completed of the 2.6cm right mid lobe of the thyroid.     Anesthesia:  Local    Complications: None    Estimated Blood Loss: minimal    Medications (Filter: Administrations occurring from 0813 to 0848 on 10/14/24) As of 10/14/24 0848      lidocaine PF (Xylocaine) 10 mg/mL (1 %) injection (mL) Total volume:  20 mL      Date/Time Rate/Dose/Volume Action       10/14/24  0829 10 mL Given      0835 10 mL Given                   ID Type Source Tests Collected by Time   A : Right superior lobe thyroid FNA Non-Gynecologic Cytology THYROID FINE NEEDLE ASPIRATION RIGHT SUPERIOR LOBE CYTOLOGY CONSULTATION (NON-GYNECOLOGIC) MARGARITO Martini 10/14/2024 0833   B : Right Mid lobe thyroid FNA Non-Gynecologic Cytology THYROID FINE NEEDLE ASPIRATION RIGHT MID LOBE CYTOLOGY CONSULTATION (NON-GYNECOLOGIC) MARGARITO Martini 10/14/2024 0839       See detailed result report with images in PACS.    The patient tolerated the procedure well without incident or complication and is in stable condition.

## 2024-10-15 LAB
LABORATORY COMMENT REPORT: NORMAL
LABORATORY COMMENT REPORT: NORMAL
PATH REPORT.COMMENTS IMP SPEC: NORMAL
PATH REPORT.FINAL DX SPEC: NORMAL
PATH REPORT.GROSS SPEC: NORMAL
PATH REPORT.TOTAL CANCER: NORMAL

## 2024-10-17 ENCOUNTER — LAB (OUTPATIENT)
Dept: LAB | Facility: LAB | Age: 74
End: 2024-10-17

## 2024-10-17 DIAGNOSIS — C61 PROSTATE CANCER (MULTI): ICD-10-CM

## 2024-10-17 LAB — PSA SERPL-MCNC: 0.38 NG/ML

## 2024-10-17 PROCEDURE — 84153 ASSAY OF PSA TOTAL: CPT

## 2024-10-17 PROCEDURE — 36415 COLL VENOUS BLD VENIPUNCTURE: CPT

## 2024-10-18 ENCOUNTER — PATIENT MESSAGE (OUTPATIENT)
Dept: OTOLARYNGOLOGY | Facility: HOSPITAL | Age: 74
End: 2024-10-18
Payer: MEDICARE

## 2024-10-18 ENCOUNTER — TELEPHONE (OUTPATIENT)
Dept: OTOLARYNGOLOGY | Facility: HOSPITAL | Age: 74
End: 2024-10-18
Payer: MEDICARE

## 2024-10-22 ENCOUNTER — TELEPHONE (OUTPATIENT)
Dept: RADIATION ONCOLOGY | Facility: HOSPITAL | Age: 74
End: 2024-10-22
Payer: MEDICARE

## 2024-10-23 ASSESSMENT — ENCOUNTER SYMPTOMS
FREQUENCY: 0
JOINT SWELLING: 0
CONSTIPATION: 0
DIARRHEA: 0
ABDOMINAL PAIN: 0
FATIGUE: 0
FEVER: 0
HEMATURIA: 0
DYSURIA: 0
PALPITATIONS: 0
PSYCHIATRIC NEGATIVE: 1
ARTHRALGIAS: 0
DIFFICULTY URINATING: 0
CHEST TIGHTNESS: 0
DIZZINESS: 0
UNEXPECTED WEIGHT CHANGE: 0
BLOOD IN STOOL: 0
ALLERGIC/IMMUNOLOGIC NEGATIVE: 1
WEAKNESS: 0
ANAL BLEEDING: 0
COUGH: 0
SHORTNESS OF BREATH: 0
RECTAL PAIN: 0
BACK PAIN: 0

## 2024-10-23 NOTE — PROGRESS NOTES
"Cancer synopsis:  Rad/onc: Dr. Iqbal/Roxana ANDERSON    74 y.o. male with Prostate cancer (CMS/HCC), Clinical: cT1c, cN0, PSA: 6.6, Grade Group: 2.      8/31/2023 6.6     12/7/2023 PSA 6.14     12/14/2023 MRI prostate, compared to 2/16/2023, noted enlargement of the PI-RADS 5 lesion in the left anterior lateral TZ with abutment of the anterior capsule without PILI.  No SVI or abnormal lymphadenopathy.    SBRT to prostate and SV 02/2024    PMH:  Hypothyroidism, HTN, OA, BPH, pre-diabetes, thyroid nodule (followed by ENT Dr shore)    Recent imaging:  Vascular US abd 06/2024: AAA negative  Ct chest avd/lumbar/thoracic spine/ cervical spine and ct head 02/25/2024: Acute, nondisplaced fractures of the left posterior 5th through 8th ribs. Right lobe thyroid nodules measuring up to 2.7 cm.    History of presenting illness:    Patient ID: [id number]     Anthony Henry \"Romario" is a 74 y.o. male who presents for his FIR prostate cancer cT1c, cN0, PSA: 6.6, Grade Group: 2 now s/p RT alone.    RT Site: prostate and SV  RT Date: 02/29/2024  Hormone therapy: No  Hot Flushes: Denies  Fatigue: Denies  Bone pain: Admits to pain located at site of non displaced fractures that occurs intermittently but manageable per patient.  ED: Denies changes since RT  - Quality of erections during the last 4 weeks: 3 = Firm enough for masturbation and foreplay only  - Use of erectile dysfunction medications:  None  IPSS: virtual  Urinary symptoms: Admits reports increased frequency. nocturia two times a night. Denies weak stream, urgency or urine leakage. Reports urination is now back to baseline prior to RT.  Urinary Medications: No  Rectal bleeding: Denies  Colonoscopy: 07/2021 multiple polyps removed, diverticula noted. Intenral and external hemmoirds noted. 08/2024: non bleeding internal hemmorhds. No further repeats r/t age  Other systems: Denies SOB, CP or fever    Review of systems:  Review of Systems   Constitutional:  Negative for fatigue, " fever and unexpected weight change.   Respiratory:  Negative for cough, chest tightness and shortness of breath.    Cardiovascular:  Negative for chest pain, palpitations and leg swelling.   Gastrointestinal:  Negative for abdominal pain, anal bleeding, blood in stool, constipation, diarrhea and rectal pain.   Endocrine: Negative for cold intolerance, heat intolerance and polyuria.   Genitourinary:  Negative for decreased urine volume, difficulty urinating, dysuria, frequency, hematuria and urgency.   Musculoskeletal:  Negative for arthralgias, back pain, gait problem and joint swelling.        Reports some residual pain of ribs r/t rcent fall and non displaced fractures   Skin: Negative.    Allergic/Immunologic: Negative.    Neurological:  Negative for dizziness, syncope and weakness.   Psychiatric/Behavioral: Negative.       PERFORMANCE STATUS:  KPS/ECO, Normal activity with effort; some signs or symptoms of disease (ECOG equivalent 1)    Past Medical history  Past Medical History:   Diagnosis Date    Arthritis     BPH (benign prostatic hyperplasia)     Cataract     Colon polyp     Hemorrhoids     HL (hearing loss)     Hypertension     Personal history of other diseases of the circulatory system     History of hypertension    Personal history of other endocrine, nutritional and metabolic disease     History of diabetes mellitus    Prostate cancer (Multi) 2023    Rhinitis     Subluxation     spine      Surgical/family history  Family History   Problem Relation Name Age of Onset    Hypertension Mother Kristal     Bilateral breast cancer Mother Kristal     Colon cancer Mother Kristal     Other (htn) Mother Kristal     Cancer Mother Kristal     Brain Aneurysm Mother Kristal     Parkinsonism Father      Parkinsonism Brother        Past Surgical History:   Procedure Laterality Date    COLONOSCOPY      HERNIA REPAIR Right     KNEE ARTHROSCOPY W/ DEBRIDEMENT  2013    Knee Arthroscopy  (Therapeutic)    SHOULDER Left 2017    RCR    TONSILLECTOMY      TOTAL KNEE ARTHROPLASTY Bilateral 2022      Social History  Tobacco Use: Medium Risk (9/16/2024)    Patient History     Smoking Tobacco Use: Former     Smokeless Tobacco Use: Never     Passive Exposure: Not on file       Current med list:  Current Outpatient Medications   Medication Instructions    acetaminophen (TYLENOL 8 HOUR) 650 mg, oral, Every 8 hours PRN, Do not crush, chew, or split.    atorvastatin (LIPITOR) 10 mg, oral, Daily    celecoxib (CELEBREX) 200 mg, oral, Daily    cloNIDine (Catapres-TTS) 0.3 mg/24 hr patch 1 patch, transdermal, Once Weekly, On Sundays    cyanocobalamin (vitamin B-12) 5,000 mcg, oral, Every morning    hydrALAZINE (APRESOLINE) 50 mg, oral, 2 times daily    multivitamin tablet 1 tablet, oral, Daily    potassium chloride CR 10 mEq ER tablet 10 mEq, oral, Daily RT, Take a half a tablet (5 meq)    tamsulosin (FLOMAX) 0.4 mg, oral, Daily    terazosin (HYTRIN) 2 mg, oral, Nightly    triamterene-hydrochlorothiazid (Maxzide) 75-50 mg tablet 1 tablet, oral, Daily RT      Last recorded vital:  virtual    Physical exam  virtual    Pertinent labs:  Prostate Specific AG   Date/Time Value Ref Range Status   10/17/2024 02:10 PM 0.38 <=4.00 ng/mL Final     Dx:  Problem List Items Addressed This Visit    None  PSA of 0.38 was reviewed and is declining nicely. Review of latent SE including rectal bleeding, hematuria, urinary strictures, ED where reviewed as well as how to contact office if s/s present. Denies latent SE. NCCN guidelines where reviewed and routine FUV of every 3m for first year and every 6m for four years for a total of five years was discussed. Patient verbalized understanding.     PLAN:  FUV 4m  Labs PSA in 4m  Imaging none  FUV other providers: PCP for routine evals, ENT needle bx of thyroid.    Please contact office with any concerns:  [facility]  [phone]

## 2024-10-24 ENCOUNTER — HOSPITAL ENCOUNTER (OUTPATIENT)
Dept: RADIATION ONCOLOGY | Facility: HOSPITAL | Age: 74
Setting detail: RADIATION/ONCOLOGY SERIES
Discharge: HOME | End: 2024-10-24
Payer: MEDICARE

## 2024-10-24 DIAGNOSIS — C61 MALIGNANT NEOPLASM OF PROSTATE (MULTI): Primary | ICD-10-CM

## 2024-10-24 PROCEDURE — 99213 OFFICE O/P EST LOW 20 MIN: CPT

## 2024-10-24 PROCEDURE — 99213 OFFICE O/P EST LOW 20 MIN: CPT | Mod: 95

## 2024-11-04 DIAGNOSIS — I10 PRIMARY HYPERTENSION: ICD-10-CM

## 2024-11-04 RX ORDER — CLONIDINE 0.3 MG/24H
PATCH, EXTENDED RELEASE TRANSDERMAL
Qty: 4 PATCH | Refills: 0 | Status: SHIPPED | OUTPATIENT
Start: 2024-11-04

## 2024-11-08 ASSESSMENT — ENCOUNTER SYMPTOMS
NECK PAIN: 0
NAUSEA: 0
ADENOPATHY: 0
UNEXPECTED WEIGHT CHANGE: 0
FACIAL SWELLING: 0
VOMITING: 0
FEVER: 0
APPETITE CHANGE: 0
STRIDOR: 0
CHILLS: 0
COUGH: 0
SORE THROAT: 0
SHORTNESS OF BREATH: 0
VOICE CHANGE: 0
FATIGUE: 0

## 2024-11-08 NOTE — PROGRESS NOTES
Chief Complaint   Patient presents with    Follow-up     HPI  Anthony Henry is a 74 y.o. male following up with me today for a repeat biopsy of his thyroid nodule. He had a biopsy of both the right inferior and right mid thyroid nodule on 10/14/24. Path from both were non- diagnostic.  No change since last visit.  No voice changes.  No change in swallowing.     History:    Dx1: prostate cancer   Dx2: thyroid nodules x2  2/2024: CT chest- right lobe thyroid nodules measuring up to 2.5 cm.   5/16/24: TSH 1.03.   9/19/24: thyroid US- R lobe measures 7.4 x 3.3 x 4.1 cm. Right thyroid lobe superior aspect 2.4 x 1.9 x 2.2 cm. TIRADS 4 nodule. Right mid lobe nodule 2.4 x 2.4 x 2.6 cm TIRADS 3 nodule.   10/14/24: FNA of right thyroid nodules x 2. Path non diagnostic  11/18/24: repeat FNA of right thyroid nodules x2    Social History  He reports that he quit smoking about 11 years ago. His smoking use included cigarettes and cigars. He started smoking about 16 years ago. He has a 1.3 pack-year smoking history. He has never used smokeless tobacco. He reports current alcohol use of about 1.0 standard drink of alcohol per week. He reports that he does not use drugs.    ROS  Review of Systems   Constitutional:  Negative for appetite change, chills, fatigue, fever and unexpected weight change.   HENT:  Negative for dental problem, drooling, ear pain, facial swelling, hearing loss, mouth sores, sore throat, tinnitus, trouble swallowing and voice change.    Respiratory:  Negative for cough, shortness of breath and stridor.    Gastrointestinal:  Negative for nausea and vomiting.   Musculoskeletal:  Negative for neck pain.   Hematological:  Negative for adenopathy.      PE  ENT Physical Exam  Constitutional  Appearance: patient appears well-developed, patient is cooperative;  Communication/Voice: communication appropriate for developmental age;  Head and Face  Appearance: head appears normal and face appears normal;  Ear  Auricles:  right auricle normal; left auricle normal;  Ear Canals: right ear canal normal; left ear canal normal;  Tympanic Membranes: right tympanic membrane normal; left tympanic membrane normal;  Nose  External Nose: nares patent bilaterally; external nose normal;  Internal Nose: nasal mucosa normal; septum normal;  Oral Cavity/Oropharynx  Lips: normal;  Gums: gingiva normal;  Tongue: normal;  Oral mucosa: normal;  Neck  Neck: neck normal;  Thyroid: nodules present on right lobe;  Respiratory  Inspection: breathing unlabored;  Cardiovascular  Inspection: extremities are warm and well perfused;  Lymphatic  Palpation: no cervical adenopathy noted;  Neurovestibular  Mental Status: alert and oriented;  Psychiatric: mood normal; affect is appropriate;  Cranial Nerves: cranial nerves intact;       Procedures   Procedure: Ultrasound guided FNA of right thyroid nodule x2  Preop dx: Right thyroid nodule mid and superior   Postop dx: Right thyroid nodule mid and superior  Indications: Right thyroid nodule, mid- 2.6cm and superior-2.4cm  Anesthesia: 1% lidocaine with epinephrine  Procedure: After discussion of the risks, benefits and alternatives the patient elected to undergo ultrasound guided FNA of their thyroid nodule. The patient's neck was cleaned with alcohol. An ultrasound was performed using our Head and Neck Department US machine. The left and right thyroid lobes were visualized. There were no concerning nodules on the left consistent with previous ultrasound. On the right the concerning nodule#1 - mid 2.6cm was localized. The neck was cleaned with alcohol and 2ml of 1% lidocaine with epinephrine was injected. Once this was allowed to take effect the ultrasound probe was again used to localize this nodule. The neck was again cleaned with alcohol. Using a 22 guage needle this was introduced under ultrasound guidance and visualized to enter the nodule and the aspiration was performed. This was placed in cytolyte. A second  aspiration was performed and again the contents were placed in cytolyte.     The procedure was then repeat with nodule #2 - superior 2.4cm.  The neck was again cleaned with alcohol. Using a 22 guage needle this was introduced under ultrasound guidance and visualized to enter the nodule and the aspiration was performed. This was placed in cytolyte. A second aspiration was performed and again the contents were placed in cytolyte.     The procedure was complete. Pressure and then a bandaid was applied to the patient's neck. The patient was monitored and did well with no complications or adverse reactions.    ASSESSMENT AND PLAN  Problem List Items Addressed This Visit       Thyroid nodule greater than or equal to 1.5 cm in diameter incidentally noted on imaging study    Current Assessment & Plan     I did explain the diagnosis of thyroid nodules x2 and the plans for evaluation and treatment of the problem.    The nodules both meet criteria for US guided FNA and was previously performed but was non-diagnostic  Recommend repeat US guided FNA which was successfully performed today  Will wait for cytopathology - will contact with results  I answered all of the patients questions.  They did appear to understand and confirmed this, and do agree to proceed as outlined above.          Relevant Orders    Cytology Consultation (Non-Gynecologic)        Laura Soni MD    Head & Neck Surgical Oncology & Reconstruction  Department of Otolaryngology - Head and Neck Surgery     By signing my name below, I, Lou Guadarrama, attest that this documentation has been prepared under the direction and in the presence of Dr. Laura Soni MD.     All medical record entries made by the Scribe were at my direction and personally dictated by me, Dr. Laura Soni. I have reviewed the chart and agree that the record accurately reflects my personal performance of the history, physical exam, discussion and plan.

## 2024-11-15 DIAGNOSIS — E78.9 BORDERLINE HIGH CHOLESTEROL: ICD-10-CM

## 2024-11-15 DIAGNOSIS — R39.12 WEAK URINARY STREAM: ICD-10-CM

## 2024-11-15 DIAGNOSIS — N40.0 BPH WITHOUT OBSTRUCTION/LOWER URINARY TRACT SYMPTOMS: ICD-10-CM

## 2024-11-15 RX ORDER — TERAZOSIN 2 MG/1
2 CAPSULE ORAL
Qty: 90 CAPSULE | Refills: 0 | Status: SHIPPED | OUTPATIENT
Start: 2024-11-15

## 2024-11-15 RX ORDER — TAMSULOSIN HYDROCHLORIDE 0.4 MG/1
0.4 CAPSULE ORAL DAILY
Qty: 90 CAPSULE | Refills: 0 | Status: SHIPPED | OUTPATIENT
Start: 2024-11-15

## 2024-11-15 RX ORDER — ATORVASTATIN CALCIUM 10 MG/1
10 TABLET, FILM COATED ORAL DAILY
Qty: 90 TABLET | Refills: 0 | Status: SHIPPED | OUTPATIENT
Start: 2024-11-15

## 2024-11-18 ENCOUNTER — APPOINTMENT (OUTPATIENT)
Dept: OTOLARYNGOLOGY | Facility: CLINIC | Age: 74
End: 2024-11-18
Payer: MEDICARE

## 2024-11-18 VITALS — HEIGHT: 71 IN | BODY MASS INDEX: 27.72 KG/M2 | TEMPERATURE: 98.6 F | WEIGHT: 198 LBS

## 2024-11-18 DIAGNOSIS — E04.1 THYROID NODULE GREATER THAN OR EQUAL TO 1.5 CM IN DIAMETER INCIDENTALLY NOTED ON IMAGING STUDY: ICD-10-CM

## 2024-11-18 PROCEDURE — 76942 ECHO GUIDE FOR BIOPSY: CPT | Performed by: OTOLARYNGOLOGY

## 2024-11-18 PROCEDURE — 99214 OFFICE O/P EST MOD 30 MIN: CPT | Performed by: OTOLARYNGOLOGY

## 2024-11-18 NOTE — PATIENT INSTRUCTIONS
Dr. Soni evaluated you today.    Your care plan is outlined below:  -- Follow up based on cytology results    General appointment line please call [phone]  For general questions or scheduling issues please call [phone] option #[phone]   For medical questions or surgery scheduling please call [phone] on Mondays, Wednesdays and Thursdays or [phone] on Tuesdays and Fridays. Please be sure to leave a voice mail or your call will not be able to be returned.     Dr. Soni makes every effort to run on time for your appointments.  Therefore, if you are more than 30 minutes late for your appointment, unrelated to a scan or another appointment such as chemotherapy or radiation, your appointment will need to be rescheduled to another day.  We appreciate your understanding.

## 2024-11-19 ENCOUNTER — LAB (OUTPATIENT)
Dept: LAB | Facility: LAB | Age: 74
End: 2024-11-19
Payer: MEDICARE

## 2024-11-19 DIAGNOSIS — Z00.00 ANNUAL PHYSICAL EXAM: ICD-10-CM

## 2024-11-19 DIAGNOSIS — I10 PRIMARY HYPERTENSION: ICD-10-CM

## 2024-11-19 DIAGNOSIS — R73.03 PRE-DIABETES: ICD-10-CM

## 2024-11-19 DIAGNOSIS — E03.9 ACQUIRED HYPOTHYROIDISM: ICD-10-CM

## 2024-11-19 DIAGNOSIS — E78.9 BORDERLINE HIGH CHOLESTEROL: ICD-10-CM

## 2024-11-19 LAB
ALBUMIN SERPL BCP-MCNC: 4.2 G/DL (ref 3.4–5)
ALP SERPL-CCNC: 65 U/L (ref 33–136)
ALT SERPL W P-5'-P-CCNC: 23 U/L (ref 10–52)
ANION GAP SERPL CALC-SCNC: 13 MMOL/L (ref 10–20)
AST SERPL W P-5'-P-CCNC: 20 U/L (ref 9–39)
BILIRUB SERPL-MCNC: 0.9 MG/DL (ref 0–1.2)
BUN SERPL-MCNC: 20 MG/DL (ref 6–23)
CALCIUM SERPL-MCNC: 9.4 MG/DL (ref 8.6–10.6)
CHLORIDE SERPL-SCNC: 107 MMOL/L (ref 98–107)
CHOLEST SERPL-MCNC: 109 MG/DL (ref 0–199)
CHOLESTEROL/HDL RATIO: 2.3
CO2 SERPL-SCNC: 27 MMOL/L (ref 21–32)
CREAT SERPL-MCNC: 1.17 MG/DL (ref 0.5–1.3)
EGFRCR SERPLBLD CKD-EPI 2021: 65 ML/MIN/1.73M*2
ERYTHROCYTE [DISTWIDTH] IN BLOOD BY AUTOMATED COUNT: 12.5 % (ref 11.5–14.5)
EST. AVERAGE GLUCOSE BLD GHB EST-MCNC: 123 MG/DL
GLUCOSE SERPL-MCNC: 120 MG/DL (ref 74–99)
HBA1C MFR BLD: 5.9 %
HCT VFR BLD AUTO: 44.9 % (ref 41–52)
HDLC SERPL-MCNC: 47 MG/DL
HGB BLD-MCNC: 15.2 G/DL (ref 13.5–17.5)
LDLC SERPL CALC-MCNC: 48 MG/DL
MCH RBC QN AUTO: 31.5 PG (ref 26–34)
MCHC RBC AUTO-ENTMCNC: 33.9 G/DL (ref 32–36)
MCV RBC AUTO: 93 FL (ref 80–100)
NON HDL CHOLESTEROL: 62 MG/DL (ref 0–149)
NRBC BLD-RTO: 0 /100 WBCS (ref 0–0)
PLATELET # BLD AUTO: 176 X10*3/UL (ref 150–450)
POTASSIUM SERPL-SCNC: 3.8 MMOL/L (ref 3.5–5.3)
PROT SERPL-MCNC: 6.2 G/DL (ref 6.4–8.2)
RBC # BLD AUTO: 4.83 X10*6/UL (ref 4.5–5.9)
SODIUM SERPL-SCNC: 143 MMOL/L (ref 136–145)
TRIGL SERPL-MCNC: 70 MG/DL (ref 0–149)
TSH SERPL-ACNC: 1.43 MIU/L (ref 0.44–3.98)
VLDL: 14 MG/DL (ref 0–40)
WBC # BLD AUTO: 8.5 X10*3/UL (ref 4.4–11.3)

## 2024-11-19 PROCEDURE — 83036 HEMOGLOBIN GLYCOSYLATED A1C: CPT

## 2024-11-19 PROCEDURE — 85027 COMPLETE CBC AUTOMATED: CPT

## 2024-11-19 PROCEDURE — 80053 COMPREHEN METABOLIC PANEL: CPT

## 2024-11-19 PROCEDURE — 84443 ASSAY THYROID STIM HORMONE: CPT

## 2024-11-19 PROCEDURE — 80061 LIPID PANEL: CPT

## 2024-11-19 PROCEDURE — 36415 COLL VENOUS BLD VENIPUNCTURE: CPT

## 2024-11-19 ASSESSMENT — ENCOUNTER SYMPTOMS: TROUBLE SWALLOWING: 0

## 2024-11-20 DIAGNOSIS — E04.1 THYROID NODULE GREATER THAN OR EQUAL TO 1.5 CM IN DIAMETER INCIDENTALLY NOTED ON IMAGING STUDY: ICD-10-CM

## 2024-11-20 LAB
LABORATORY COMMENT REPORT: NORMAL
LABORATORY COMMENT REPORT: NORMAL
PATH REPORT.FINAL DX SPEC: NORMAL
PATH REPORT.GROSS SPEC: NORMAL
PATH REPORT.RELEVANT HX SPEC: NORMAL
PATH REPORT.TOTAL CANCER: NORMAL

## 2024-11-20 NOTE — ASSESSMENT & PLAN NOTE
I did explain the diagnosis of thyroid nodules x2 and the plans for evaluation and treatment of the problem.    The nodules both meet criteria for US guided FNA and was previously performed but was non-diagnostic  Recommend repeat US guided FNA which was successfully performed today  Will wait for cytopathology - will contact with results  I answered all of the patients questions.  They did appear to understand and confirmed this, and do agree to proceed as outlined above.

## 2024-11-21 ENCOUNTER — APPOINTMENT (OUTPATIENT)
Dept: PRIMARY CARE | Facility: CLINIC | Age: 74
End: 2024-11-21
Payer: MEDICARE

## 2024-11-21 VITALS
SYSTOLIC BLOOD PRESSURE: 116 MMHG | WEIGHT: 211 LBS | TEMPERATURE: 98 F | HEART RATE: 72 BPM | DIASTOLIC BLOOD PRESSURE: 71 MMHG | BODY MASS INDEX: 29.43 KG/M2 | OXYGEN SATURATION: 93 %

## 2024-11-21 DIAGNOSIS — I10 PRIMARY HYPERTENSION: ICD-10-CM

## 2024-11-21 DIAGNOSIS — E78.49 FAMILIAL HYPERLIPIDEMIA: ICD-10-CM

## 2024-11-21 DIAGNOSIS — Z00.00 ANNUAL PHYSICAL EXAM: Primary | ICD-10-CM

## 2024-11-21 DIAGNOSIS — R39.12 WEAK URINARY STREAM: ICD-10-CM

## 2024-11-21 DIAGNOSIS — N40.0 BPH WITHOUT OBSTRUCTION/LOWER URINARY TRACT SYMPTOMS: ICD-10-CM

## 2024-11-21 DIAGNOSIS — R73.01 ABNORMAL FASTING GLUCOSE: ICD-10-CM

## 2024-11-21 DIAGNOSIS — M17.0 OSTEOARTHRITIS OF BOTH KNEES, UNSPECIFIED OSTEOARTHRITIS TYPE: ICD-10-CM

## 2024-11-21 DIAGNOSIS — E78.9 BORDERLINE HIGH CHOLESTEROL: ICD-10-CM

## 2024-11-21 PROCEDURE — 99397 PER PM REEVAL EST PAT 65+ YR: CPT | Performed by: FAMILY MEDICINE

## 2024-11-21 PROCEDURE — 3074F SYST BP LT 130 MM HG: CPT | Performed by: FAMILY MEDICINE

## 2024-11-21 PROCEDURE — 99214 OFFICE O/P EST MOD 30 MIN: CPT | Performed by: FAMILY MEDICINE

## 2024-11-21 PROCEDURE — 1159F MED LIST DOCD IN RCRD: CPT | Performed by: FAMILY MEDICINE

## 2024-11-21 PROCEDURE — 1036F TOBACCO NON-USER: CPT | Performed by: FAMILY MEDICINE

## 2024-11-21 PROCEDURE — 3078F DIAST BP <80 MM HG: CPT | Performed by: FAMILY MEDICINE

## 2024-11-21 PROCEDURE — 1123F ACP DISCUSS/DSCN MKR DOCD: CPT | Performed by: FAMILY MEDICINE

## 2024-11-21 PROCEDURE — 1126F AMNT PAIN NOTED NONE PRSNT: CPT | Performed by: FAMILY MEDICINE

## 2024-11-21 RX ORDER — ATORVASTATIN CALCIUM 10 MG/1
10 TABLET, FILM COATED ORAL DAILY
Qty: 90 TABLET | Refills: 3 | Status: SHIPPED | OUTPATIENT
Start: 2024-11-21

## 2024-11-21 RX ORDER — HYDRALAZINE HYDROCHLORIDE 50 MG/1
50 TABLET, FILM COATED ORAL 2 TIMES DAILY
Qty: 180 TABLET | Refills: 1 | Status: SHIPPED | OUTPATIENT
Start: 2024-11-21

## 2024-11-21 RX ORDER — TRIAMTERENE AND HYDROCHLOROTHIAZIDE 75; 50 MG/1; MG/1
1 TABLET ORAL
Qty: 90 TABLET | Refills: 1 | Status: SHIPPED | OUTPATIENT
Start: 2024-11-21

## 2024-11-21 RX ORDER — TAMSULOSIN HYDROCHLORIDE 0.4 MG/1
0.4 CAPSULE ORAL DAILY
Qty: 90 CAPSULE | Refills: 3 | Status: SHIPPED | OUTPATIENT
Start: 2024-11-21

## 2024-11-21 RX ORDER — CLONIDINE 0.3 MG/24H
PATCH, EXTENDED RELEASE TRANSDERMAL
Qty: 12 PATCH | Refills: 1 | Status: SHIPPED | OUTPATIENT
Start: 2024-11-21

## 2024-11-21 RX ORDER — POTASSIUM CHLORIDE 750 MG/1
10 TABLET, FILM COATED, EXTENDED RELEASE ORAL
Qty: 90 TABLET | Refills: 3 | Status: SHIPPED | OUTPATIENT
Start: 2024-11-21

## 2024-11-21 RX ORDER — TERAZOSIN 2 MG/1
2 CAPSULE ORAL NIGHTLY
Qty: 90 CAPSULE | Refills: 3 | Status: SHIPPED | OUTPATIENT
Start: 2024-11-21

## 2024-11-21 RX ORDER — CELECOXIB 200 MG/1
200 CAPSULE ORAL DAILY
Qty: 90 CAPSULE | Refills: 1 | Status: SHIPPED | OUTPATIENT
Start: 2024-11-21

## 2024-11-21 RX ORDER — METFORMIN HYDROCHLORIDE 500 MG/1
500 TABLET ORAL
Qty: 90 TABLET | Refills: 1 | Status: SHIPPED | OUTPATIENT
Start: 2024-11-21

## 2024-11-21 ASSESSMENT — PATIENT HEALTH QUESTIONNAIRE - PHQ9
6. FEELING BAD ABOUT YOURSELF - OR THAT YOU ARE A FAILURE OR HAVE LET YOURSELF OR YOUR FAMILY DOWN: NOT AT ALL
5. POOR APPETITE OR OVEREATING: NOT AT ALL
3. TROUBLE FALLING OR STAYING ASLEEP OR SLEEPING TOO MUCH: MORE THAN HALF THE DAYS
2. FEELING DOWN, DEPRESSED OR HOPELESS: NOT AT ALL
1. LITTLE INTEREST OR PLEASURE IN DOING THINGS: NOT AT ALL
SUM OF ALL RESPONSES TO PHQ QUESTIONS 1-9: 3
7. TROUBLE CONCENTRATING ON THINGS, SUCH AS READING THE NEWSPAPER OR WATCHING TELEVISION: NOT AT ALL
SUM OF ALL RESPONSES TO PHQ9 QUESTIONS 1 AND 2: 0
9. THOUGHTS THAT YOU WOULD BE BETTER OFF DEAD, OR OF HURTING YOURSELF: NOT AT ALL
10. IF YOU CHECKED OFF ANY PROBLEMS, HOW DIFFICULT HAVE THESE PROBLEMS MADE IT FOR YOU TO DO YOUR WORK, TAKE CARE OF THINGS AT HOME, OR GET ALONG WITH OTHER PEOPLE: NOT DIFFICULT AT ALL
8. MOVING OR SPEAKING SO SLOWLY THAT OTHER PEOPLE COULD HAVE NOTICED. OR THE OPPOSITE, BEING SO FIGETY OR RESTLESS THAT YOU HAVE BEEN MOVING AROUND A LOT MORE THAN USUAL: NOT AT ALL
4. FEELING TIRED OR HAVING LITTLE ENERGY: SEVERAL DAYS

## 2024-11-21 ASSESSMENT — ANXIETY QUESTIONNAIRES
6. BECOMING EASILY ANNOYED OR IRRITABLE: NOT AT ALL
3. WORRYING TOO MUCH ABOUT DIFFERENT THINGS: NOT AT ALL
7. FEELING AFRAID AS IF SOMETHING AWFUL MIGHT HAPPEN: NOT AT ALL
GAD7 TOTAL SCORE: 0
IF YOU CHECKED OFF ANY PROBLEMS ON THIS QUESTIONNAIRE, HOW DIFFICULT HAVE THESE PROBLEMS MADE IT FOR YOU TO DO YOUR WORK, TAKE CARE OF THINGS AT HOME, OR GET ALONG WITH OTHER PEOPLE: NOT DIFFICULT AT ALL
5. BEING SO RESTLESS THAT IT IS HARD TO SIT STILL: NOT AT ALL
1. FEELING NERVOUS, ANXIOUS, OR ON EDGE: NOT AT ALL
2. NOT BEING ABLE TO STOP OR CONTROL WORRYING: NOT AT ALL
4. TROUBLE RELAXING: NOT AT ALL

## 2024-11-21 ASSESSMENT — ENCOUNTER SYMPTOMS
LOSS OF SENSATION IN FEET: 0
OCCASIONAL FEELINGS OF UNSTEADINESS: 1
DEPRESSION: 0

## 2024-11-21 ASSESSMENT — PAIN SCALES - GENERAL: PAINLEVEL_OUTOF10: 0-NO PAIN

## 2024-11-21 NOTE — ASSESSMENT & PLAN NOTE
Stable on prn Celecoxib.  Renal functions in normal range.  Follow up at least annually.  Orders:    celecoxib (CeleBREX) 200 mg capsule; Take 1 capsule (200 mg) by mouth once daily.

## 2024-11-21 NOTE — PROGRESS NOTES
"Randy Henry \"Romario" is a 74 y.o. male and is here for a comprehensive physical exam. The patient reports  the following concerns .    LEFT ANKLE: swelling in the evenings  No trauma or change in activity  Not really painful, dos go down by the morning  No redness, warmth or rash  NO recent extended flights or risks for DVT  Not keeping him from playing Tennis several days per week.    HTN:  BP controlled today  Taking medications as directed - no side effects noted.  Denies CP, SOB, Edema, palpitations or headache.     HYPERLIPIDEMIA:  Patient is tolerating regular Atorvastatin dosing without muscle achiness or weakness.  Review of last fasting lipids with good control.  Lab Results   Component Value Date    CHOL 109 11/19/2024     Lab Results   Component Value Date    HDL 47.0 11/19/2024     Lab Results   Component Value Date    LDLCALC 48 11/19/2024     Lab Results   Component Value Date    TRIG 70 11/19/2024     No components found for: \"CHOLHDL\"       Do you take any herbs or supplements that were not prescribed by a doctor? yes vti b 12  Are you taking calcium supplements? yes  Are you taking aspirin daily? no    SOC Hx:   Tobacco Use: Medium Risk (11/21/2024)    Patient History     Smoking Tobacco Use: Former     Smokeless Tobacco Use: Never     Passive Exposure: Not on file     Alcohol Use: Not At Risk (2/26/2024)    AUDIT-C     Frequency of Alcohol Consumption: Never     Average Number of Drinks: Patient does not drink     Frequency of Binge Drinking: Never       DIET: general    EXERCISE:  tennis 3 times per week    ELIMINATION: no constipation or diarrhea  COLON CA SCREENING: COLONOSCOPY 8/20/2024 REPEAT DUE - d/c to age recommendations years    URINARY SX: urinary frequency  Prostate Specific AG   Date Value Ref Range Status   10/17/2024 0.38 <=4.00 ng/mL Final     PSA, Free   Date Value Ref Range Status   09/14/2020 0.4 ng/mL Final     PSA, Free Pct   Date Value Ref Range Status "   09/14/2020 9 % Final     Comment:     INTERPRETIVE INFORMATION: Prostate Specific Antigen, Free   Percentage  [employer] uses the Roche Free PSA electrochemiluminescent immunoassay   method in conjunction with the Roche PSA electrochemiluminescent   immunoassay method to determine the free PSA percentage. Values   obtained with different assay methods should not be used   interchangeably. The free PSA percentage is an aid in   distinguishing prostate cancer from benign prostatic conditions in   men age 50 and older with a total PSA between 3 and 10 ng/mL and   negative digital rectal examination findings. Prostatic biopsy is   required for the diagnosis of cancer.  In patients with total PSA concentrations of 4-10 ng/mL, the   probability of finding prostate cancer on needle biopsy by age in   years is:  %fPSA               50-59    60-69    70 or older  0  - 10%            49%      58%      65%  11 - 18%            27%      34%      41%  19 - 25%            18%      24%      30%  Greater than 25%     9%      12%      16%  Other factors may help determine the actual risk of prostate   cancer in individual patients.  Performed By: [facility]  [address] [place] [id number]  : MD Dr. Elissa Newman, urology / urologic Oncology    SLEEP:  difficulty falling asleep; waking to void but having difficulty falling back asleep  Tried melatonin minimal relief  TylenolPMmakes himtoo groggy     MOODS: stress manageable, no concerning symptoms  Patient Health Questionnaire-9 Score: 3  JASBIR-7 Total Score: 0       Health Maintenance Due   Topic Date Due    Hepatitis C Screening  Never done        Objective     VITALS:  /71 (BP Location: Left arm, Patient Position: Sitting, BP Cuff Size: Adult)   Pulse 72   Temp 36.7 °C (98 °F) (Temporal)   Wt 95.7 kg (211 lb)   SpO2 93%   BMI 29.43 kg/m²      Physical Exam  Constitutional:       General: He is not in acute distress.      Appearance: Normal appearance.   HENT:      Head: Normocephalic and atraumatic.      Right Ear: Tympanic membrane, ear canal and external ear normal.      Left Ear: Tympanic membrane, ear canal and external ear normal.      Nose: Nose normal.      Mouth/Throat:      Mouth: Mucous membranes are moist.      Pharynx: No oropharyngeal exudate or posterior oropharyngeal erythema.   Eyes:      Extraocular Movements: Extraocular movements intact.      Conjunctiva/sclera: Conjunctivae normal.      Pupils: Pupils are equal, round, and reactive to light.   Cardiovascular:      Rate and Rhythm: Normal rate and regular rhythm.      Heart sounds: No murmur heard.  Pulmonary:      Effort: Pulmonary effort is normal.      Breath sounds: Normal breath sounds.   Abdominal:      General: Bowel sounds are normal.      Palpations: Abdomen is soft.   Musculoskeletal:         General: Normal range of motion.      Cervical back: No rigidity.   Lymphadenopathy:      Cervical: No cervical adenopathy.   Skin:     General: Skin is warm and dry.      Findings: No rash.   Neurological:      General: No focal deficit present.      Mental Status: He is alert and oriented to person, place, and time.      Cranial Nerves: No cranial nerve deficit.      Gait: Gait normal.   Psychiatric:         Mood and Affect: Mood normal.         Behavior: Behavior normal.         Assessment/Plan   Healthy male exam.      1. Reviewed recent fasting labs.     2. Patient Counseling:  --Nutrition: Stressed importance of moderation in sodium/caffeine intake, saturated fat and cholesterol, caloric balance, sufficient intake of fresh fruits, vegetables, fiber, calcium, iron, and 1 mg of folate supplement per day (for females capable of pregnancy).  --Exercise: Stressed the importance of regular exercise.   --Immunizations reviewed.  --Discussed benefits of screening colonoscopy(patients > 45 years of age)    3. Discussed the patient's BMI with him.  The BMI is above  average. The patient received Current weight: 95.7 kg (211 lb)  Weight change since last visit (-) denotes wt loss 13 lbs   Weight loss needed to achieve BMI 25:   Lbs  Weight loss needed to achieve BMI 30:   Lbs    Provided instructions on dietary changes  Provided instructions on exercise  Advised to Increase physical activity because they have an above normal BMI.    Assessment & Plan  Annual physical exam         Borderline high cholesterol  Well controlled on Atorvastatin.  Refilling at current dosage.  Follow up at least annually.  Orders:    atorvastatin (Lipitor) 10 mg tablet; Take 1 tablet (10 mg) by mouth once daily.    Lipid Panel; Future    Osteoarthritis of both knees, unspecified osteoarthritis type  Stable on prn Celecoxib.  Renal functions in normal range.  Follow up at least annually.  Orders:    celecoxib (CeleBREX) 200 mg capsule; Take 1 capsule (200 mg) by mouth once daily.    Primary hypertension  BP controlled.  Refilling medications at current dosage.  Follow up 6 months.  Orders:    cloNIDine (Catapres-TTS) 0.3 mg/24 hr patch; Apply one patach on the skin and replace every 7 days, as directed    hydrALAZINE (Apresoline) 50 mg tablet; Take 1 tablet (50 mg) by mouth 2 times a day.    potassium chloride CR 10 mEq ER tablet; Take 1 tablet (10 mEq) by mouth once daily. Take a half a tablet (5 meq)    triamterene-hydrochlorothiazid (Maxzide) 75-50 mg tablet; Take 1 tablet by mouth once daily.    Albumin-Creatinine Ratio, Urine Random; Future    Weak urinary stream  Stable on current regimen.  Refilling at current dosages.  Follow up at least annually.  Orders:    tamsulosin (Flomax) 0.4 mg 24 hr capsule; Take 1 capsule (0.4 mg) by mouth once daily.    BPH without obstruction/lower urinary tract symptoms  Stable.  Refilling medications at current dosages.  Follow up with   Orders:    terazosin (Hytrin) 2 mg capsule; Take 1 capsule (2 mg) by mouth once daily at bedtime.    Abnormal fasting  glucose  Trial of Metformin for elevated glucose.  Reviewed risks, side effects and expected treatment course of medications.  Call with any problems or concerns.   Repeat labs in 4-6 months.  Orders:    metFORMIN (Glucophage) 500 mg tablet; Take 1 tablet (500 mg) by mouth once daily with breakfast.    Albumin-Creatinine Ratio, Urine Random; Future    Comprehensive Metabolic Panel; Future    Hemoglobin A1C; Future    Familial hyperlipidemia              Follow up in 6 months  repeat fasting labs prior to that appointment.

## 2024-11-21 NOTE — ASSESSMENT & PLAN NOTE
Stable.  Refilling medications at current dosages.  Follow up with   Orders:    terazosin (Hytrin) 2 mg capsule; Take 1 capsule (2 mg) by mouth once daily at bedtime.

## 2024-11-21 NOTE — ASSESSMENT & PLAN NOTE
BP controlled.  Refilling medications at current dosage.  Follow up 6 months.  Orders:    cloNIDine (Catapres-TTS) 0.3 mg/24 hr patch; Apply one patach on the skin and replace every 7 days, as directed    hydrALAZINE (Apresoline) 50 mg tablet; Take 1 tablet (50 mg) by mouth 2 times a day.    potassium chloride CR 10 mEq ER tablet; Take 1 tablet (10 mEq) by mouth once daily. Take a half a tablet (5 meq)    triamterene-hydrochlorothiazid (Maxzide) 75-50 mg tablet; Take 1 tablet by mouth once daily.    Albumin-Creatinine Ratio, Urine Random; Future

## 2024-11-21 NOTE — ASSESSMENT & PLAN NOTE
Well controlled on Atorvastatin.  Refilling at current dosage.  Follow up at least annually.  Orders:    atorvastatin (Lipitor) 10 mg tablet; Take 1 tablet (10 mg) by mouth once daily.    Lipid Panel; Future

## 2025-02-14 ENCOUNTER — TELEMEDICINE (OUTPATIENT)
Dept: PRIMARY CARE | Facility: CLINIC | Age: 75
End: 2025-02-14
Payer: MEDICARE

## 2025-02-14 ENCOUNTER — TELEPHONE (OUTPATIENT)
Dept: PRIMARY CARE | Facility: CLINIC | Age: 75
End: 2025-02-14

## 2025-02-14 DIAGNOSIS — U07.1 COVID-19: Primary | ICD-10-CM

## 2025-02-14 PROCEDURE — 1159F MED LIST DOCD IN RCRD: CPT | Performed by: FAMILY MEDICINE

## 2025-02-14 PROCEDURE — 1160F RVW MEDS BY RX/DR IN RCRD: CPT | Performed by: FAMILY MEDICINE

## 2025-02-14 PROCEDURE — 99213 OFFICE O/P EST LOW 20 MIN: CPT | Performed by: FAMILY MEDICINE

## 2025-02-14 PROCEDURE — 1123F ACP DISCUSS/DSCN MKR DOCD: CPT | Performed by: FAMILY MEDICINE

## 2025-02-14 RX ORDER — NIRMATRELVIR AND RITONAVIR 300-100 MG
3 KIT ORAL 2 TIMES DAILY
Qty: 30 TABLET | Refills: 0 | Status: SHIPPED | OUTPATIENT
Start: 2025-02-14 | End: 2025-02-19

## 2025-02-14 RX ORDER — NIRMATRELVIR AND RITONAVIR 300-100 MG
3 KIT ORAL 2 TIMES DAILY
Qty: 30 TABLET | Refills: 0 | Status: CANCELLED | OUTPATIENT
Start: 2025-02-14 | End: 2025-02-19

## 2025-02-14 NOTE — PROGRESS NOTES
"Subjective   Patient ID: Anthony Henry \"Romario" is a 74 y.o. male who presents for No chief complaint on file..    HPI    Virtual or Telephone Consent    An interactive audio and video telecommunication system which permits real time communications between the patient (at the originating site) and provider (at the distant site) was utilized to provide this telehealth service.   Verbal consent was requested and obtained from Anthony Henry on this date, 02/14/25 for a telehealth visit.    Review of Systems    Review of Systems negative except as noted in HPI and Chief complaint.     Objective             VITALS:  There were no vitals taken for this visit.     Physical Exam    Assessment/Plan   Problem List Items Addressed This Visit    None  Visit Diagnoses       COVID-19    -  Primary            FOLLOW UP PRN, WITH ANY PROBLEMS OR CONCERNS.       "

## 2025-02-14 NOTE — TELEPHONE ENCOUNTER
PATIENT STATES HE [facility] AND [facility] COVID-[facility],IS ASKING FOR PAXLOVID?, PLEASE ADVISE IF YOU CAN GIVE RX.

## 2025-02-25 PROCEDURE — 84153 ASSAY OF PSA TOTAL: CPT

## 2025-02-26 ENCOUNTER — LAB (OUTPATIENT)
Dept: LAB | Facility: HOSPITAL | Age: 75
End: 2025-02-26
Payer: MEDICARE

## 2025-02-26 DIAGNOSIS — C61 MALIGNANT NEOPLASM OF PROSTATE (MULTI): Primary | ICD-10-CM

## 2025-02-26 LAB — PSA SERPL-MCNC: 0.34 NG/ML

## 2025-02-27 ENCOUNTER — TELEPHONE (OUTPATIENT)
Dept: RADIATION ONCOLOGY | Facility: HOSPITAL | Age: 75
End: 2025-02-27
Payer: MEDICARE

## 2025-02-27 ASSESSMENT — ENCOUNTER SYMPTOMS
DIZZINESS: 0
CONSTIPATION: 0
DIFFICULTY URINATING: 0
ABDOMINAL PAIN: 0
JOINT SWELLING: 0
SHORTNESS OF BREATH: 0
FEVER: 0
ALLERGIC/IMMUNOLOGIC NEGATIVE: 1
UNEXPECTED WEIGHT CHANGE: 0
HEMATURIA: 0
COUGH: 0
FREQUENCY: 0
FATIGUE: 0
BACK PAIN: 0
ARTHRALGIAS: 0
ANAL BLEEDING: 0
DYSURIA: 0
PSYCHIATRIC NEGATIVE: 1
CHEST TIGHTNESS: 0
WEAKNESS: 0
BLOOD IN STOOL: 0
PALPITATIONS: 0
DIARRHEA: 0
RECTAL PAIN: 0

## 2025-02-27 NOTE — PROGRESS NOTES
"Cancer synopsis:  Rad/onc: Dr. Iqbal/Roxana ANDERSON    74 y.o. male with Prostate cancer (CMS/HCC), Clinical: cT1c, cN0, PSA: 6.6, Grade Group: 2.      8/31/2023 6.6     12/7/2023 PSA 6.14     12/14/2023 MRI prostate, compared to 2/16/2023, noted enlargement of the PI-RADS 5 lesion in the left anterior lateral TZ with abutment of the anterior capsule without PILI.  No SVI or abnormal lymphadenopathy.    SBRT to prostate and SV 02/2024    PMH:  Hypothyroidism, HTN, OA, BPH, pre-diabetes, thyroid nodule (followed by ENT Dr shore)    Recent imaging:  Vascular US abd 06/2024: AAA negative  Ct chest avd/lumbar/thoracic spine/ cervical spine and ct head 02/25/2024: Acute, nondisplaced fractures of the left posterior 5th through 8th ribs. Right lobe thyroid nodules measuring up to 2.7 cm.    History of presenting illness:    Patient ID: [id number]     Anthony Henry \"Romario" is a 74 y.o. male who presents for his FIR prostate cancer cT1c, cN0, PSA: 6.6, Grade Group: 2 now s/p RT alone.    RT Site: prostate and SV  RT Date: 02/29/2024  Hormone therapy: No  Hot Flushes: Denies  Fatigue: Denies  Bone pain: Denies  ED: Denies changes since RT  - Quality of erections during the last 4 weeks: 3 = Firm enough for masturbation and foreplay only  - Use of erectile dysfunction medications:  None  IPSS: virtual  Urinary symptoms: Admits reports increased frequency and weak stream. States after finsihing sometimes finds himself in the restroom 5 minutes later to finish. Also report stoppoing and starting. nocturia two times a night. Denies weak stream, urgency or urine leakage.   Urinary Medications: flowmax and terazosin daily  Rectal bleeding: Denies  Colonoscopy: 07/2021 multiple polyps removed, diverticula noted. Internal and external hemmoirds noted. 08/2024: non bleeding internal hemmorhds. No further repeats r/t age  Other systems: Denies SOB, CP or fever    Review of systems:  Review of Systems   Constitutional:  Negative for " fatigue, fever and unexpected weight change.   Respiratory:  Negative for cough, chest tightness and shortness of breath.    Cardiovascular:  Negative for chest pain, palpitations and leg swelling.   Gastrointestinal:  Negative for abdominal pain, anal bleeding, blood in stool, constipation, diarrhea and rectal pain.   Endocrine: Negative for cold intolerance, heat intolerance and polyuria.   Genitourinary:  Negative for decreased urine volume, difficulty urinating, dysuria, frequency, hematuria and urgency.   Musculoskeletal:  Negative for arthralgias, back pain, gait problem and joint swelling.   Skin: Negative.    Allergic/Immunologic: Negative.    Neurological:  Negative for dizziness, syncope and weakness.   Psychiatric/Behavioral: Negative.       PERFORMANCE STATUS:  KPS/ECO, Normal activity with effort; some signs or symptoms of disease (ECOG equivalent 1)    Past Medical history  Past Medical History:   Diagnosis Date    Arthritis     BPH (benign prostatic hyperplasia)     Cataract     Colon polyp     Hemorrhoids     HL (hearing loss)     Hypertension     Personal history of other diseases of the circulatory system     History of hypertension    Personal history of other endocrine, nutritional and metabolic disease     History of diabetes mellitus    Prostate cancer (Multi) 2023    Rhinitis     Subluxation     spine      Surgical/family history  Family History   Problem Relation Name Age of Onset    Hypertension Mother Kristal     Bilateral breast cancer Mother Kristal     Colon cancer Mother Kristal     Other (htn) Mother Kristal     Cancer Mother Kristal     Brain Aneurysm Mother Kristal     Parkinsonism Father      Parkinsonism Brother        Past Surgical History:   Procedure Laterality Date    COLONOSCOPY      HERNIA REPAIR Right     KNEE ARTHROSCOPY W/ DEBRIDEMENT  2013    Knee Arthroscopy (Therapeutic)    SHOULDER Left     RCR    TONSILLECTOMY      TOTAL KNEE  ARTHROPLASTY Bilateral 2022      Social History  Tobacco Use: Medium Risk (2/14/2025)    Patient History     Smoking Tobacco Use: Former     Smokeless Tobacco Use: Never     Passive Exposure: Not on file       Current med list:  Current Outpatient Medications   Medication Instructions    acetaminophen (TYLENOL 8 HOUR) 650 mg, oral, Every 8 hours PRN, Do not crush, chew, or split.    atorvastatin (LIPITOR) 10 mg, oral, Daily    celecoxib (CELEBREX) 200 mg, oral, Daily    cloNIDine (Catapres-TTS) 0.3 mg/24 hr patch Apply one patach on the skin and replace every 7 days, as directed    cyanocobalamin (vitamin B-12) 5,000 mcg, Every morning    hydrALAZINE (APRESOLINE) 50 mg, oral, 2 times daily    metFORMIN (GLUCOPHAGE) 500 mg, oral, Daily with breakfast    multivitamin tablet 1 tablet, Daily    potassium chloride CR 10 mEq ER tablet 10 mEq, oral, Daily RT, Take a half a tablet (5 meq)    tamsulosin (FLOMAX) 0.4 mg, oral, Daily    terazosin (HYTRIN) 2 mg, oral, Nightly    triamterene-hydrochlorothiazid (Maxzide) 75-50 mg tablet 1 tablet, oral, Daily RT      Last recorded vital:  virtual    Physical exam  virtual    Pertinent labs:  Prostate Specific AG   Date/Time Value Ref Range Status   02/25/2025 03:08 PM 0.34 <=4.00 ng/mL Final     Dx:  Problem List Items Addressed This Visit    None  PSA of 0.34 was reviewed and is declining nicely. Review of latent SE including rectal bleeding, hematuria, urinary strictures, ED where reviewed as well as how to contact office if s/s present. Denies latent SE. NCCN guidelines where reviewed and routine FUV of every 3m for first year and every 6m for four years for a total of five years was discussed. Patient verbalized understanding.     Flowmax and terazosin reviewed and will continue. Did reivew if continued to worsen would advise seeing urology for urolift/holep etc.    PLAN:  FUV 6m  Labs PSA in 6m  Imaging none  Flowmax and terazosin daily  FUV other providers: PCP for routine  german, ENT needle bx of thyroid.    Please contact office with any concerns:  Eric Cleveland [facility]  [phone]

## 2025-02-27 NOTE — TELEPHONE ENCOUNTER
Called pt. to remind of appointment on 2/28/2025 at 3:30 pm with KATE Schmidt. Pt's phone went to voicemail left number if needs to reschedule.

## 2025-02-28 ENCOUNTER — HOSPITAL ENCOUNTER (OUTPATIENT)
Dept: RADIATION ONCOLOGY | Facility: HOSPITAL | Age: 75
Setting detail: RADIATION/ONCOLOGY SERIES
Discharge: HOME | End: 2025-02-28
Payer: MEDICARE

## 2025-02-28 DIAGNOSIS — R39.12 WEAK URINARY STREAM: ICD-10-CM

## 2025-02-28 DIAGNOSIS — C61 MALIGNANT NEOPLASM OF PROSTATE (MULTI): Primary | ICD-10-CM

## 2025-02-28 PROCEDURE — 99213 OFFICE O/P EST LOW 20 MIN: CPT | Mod: 95

## 2025-02-28 PROCEDURE — 99212 OFFICE O/P EST SF 10 MIN: CPT | Mod: 95

## 2025-02-28 PROCEDURE — 99212 OFFICE O/P EST SF 10 MIN: CPT

## 2025-03-14 ENCOUNTER — PATIENT MESSAGE (OUTPATIENT)
Dept: PRIMARY CARE | Facility: CLINIC | Age: 75
End: 2025-03-14
Payer: MEDICARE

## 2025-03-17 ENCOUNTER — TELEMEDICINE (OUTPATIENT)
Dept: PRIMARY CARE | Facility: CLINIC | Age: 75
End: 2025-03-17
Payer: MEDICARE

## 2025-03-17 DIAGNOSIS — N40.0 BPH WITHOUT OBSTRUCTION/LOWER URINARY TRACT SYMPTOMS: Primary | ICD-10-CM

## 2025-03-17 PROCEDURE — 1123F ACP DISCUSS/DSCN MKR DOCD: CPT | Performed by: FAMILY MEDICINE

## 2025-03-17 PROCEDURE — 99213 OFFICE O/P EST LOW 20 MIN: CPT | Performed by: FAMILY MEDICINE

## 2025-03-17 RX ORDER — CIPROFLOXACIN 500 MG/1
500 TABLET ORAL 2 TIMES DAILY
Qty: 14 TABLET | Refills: 0 | Status: SHIPPED | OUTPATIENT
Start: 2025-03-17 | End: 2025-03-24

## 2025-03-17 RX ORDER — NITROFURANTOIN 25; 75 MG/1; MG/1
1 CAPSULE ORAL
COMMUNITY
Start: 2025-03-09

## 2025-03-17 ASSESSMENT — ENCOUNTER SYMPTOMS
FREQUENCY: 1
SWEATS: 0
DYSURIA: 1
NAUSEA: 0
FLANK PAIN: 1
HEMATURIA: 1
VOMITING: 0
CHILLS: 0

## 2025-03-17 NOTE — PROGRESS NOTES
"Subjective   Patient ID: Anthony Henry \"Romario" is a 74 y.o. male who presents for Follow-up (Treated for UTI- not improving. ).    Difficulty Urinating   This is a new problem. The current episode started 1 to 4 weeks ago. The problem occurs every urination. The problem has been gradually worsening. The quality of the pain is described as burning and stabbing. The pain is at a severity of 7/10. The pain is moderate. There has been no fever. He is Not sexually active. There is No history of pyelonephritis. Associated symptoms include a discharge, flank pain, frequency, hematuria, hesitancy and urgency. Pertinent negatives include no chills, nausea, possible pregnancy, sweats or vomiting.       Virtual or Telephone Consent    An interactive audio and video telecommunication system which permits real time communications between the patient (at the originating site) and provider (at the distant site) was utilized to provide this telehealth service.   Verbal consent was requested and obtained from Anthony Henry on this date, 03/17/25 for a telehealth visit and the patient's location was confirmed at the time of the visit.    Started 2 weeks ago with burning sensation he was evaluated 1 week ago at [facility] - treated for dysuria with with Macrobid 100 mg twice daily    Had consultation with urology at end of     Review of Systems   Constitutional:  Negative for chills.   Gastrointestinal:  Negative for nausea and vomiting.   Genitourinary:  Positive for dysuria, flank pain, frequency, hematuria, hesitancy and urgency.       Review of Systems negative except as noted in HPI and Chief complaint.     Objective             VITALS:  There were no vitals taken for this visit.     Physical Exam    Assessment/Plan   Assessment & Plan  BPH without obstruction/lower urinary tract symptoms    Orders:    ciprofloxacin (Cipro) 500 mg tablet; Take 1 tablet (500 mg) by mouth 2 times a day for 7 days.         FOLLOW UP {FOLLOW UP " INTERVALS:[id number]}, SOONER WITH ANY PROBLEMS OR CONCERNS.

## 2025-03-17 NOTE — ASSESSMENT & PLAN NOTE
Orders:    ciprofloxacin (Cipro) 500 mg tablet; Take 1 tablet (500 mg) by mouth 2 times a day for 7 days.

## 2025-03-19 ENCOUNTER — TELEPHONE (OUTPATIENT)
Dept: RADIATION ONCOLOGY | Facility: HOSPITAL | Age: 75
End: 2025-03-19
Payer: MEDICARE

## 2025-03-19 DIAGNOSIS — R30.0 DYSURIA: Primary | ICD-10-CM

## 2025-03-19 DIAGNOSIS — R39.12 WEAK URINARY STREAM: ICD-10-CM

## 2025-03-19 RX ORDER — TAMSULOSIN HYDROCHLORIDE 0.4 MG/1
0.4 CAPSULE ORAL 2 TIMES DAILY
Qty: 90 CAPSULE | Refills: 3 | Status: SHIPPED | OUTPATIENT
Start: 2025-03-19

## 2025-03-19 RX ORDER — PHENAZOPYRIDINE HYDROCHLORIDE 200 MG/1
200 TABLET, FILM COATED ORAL 3 TIMES DAILY PRN
Qty: 10 TABLET | Refills: 0 | Status: SHIPPED | OUTPATIENT
Start: 2025-03-19 | End: 2025-03-24

## 2025-03-19 NOTE — TELEPHONE ENCOUNTER
Returned call to patient about ongoing dysuria. Reports over the last week or two some worsening painful urination as well as frequency. States has also noticed some blood in urine. Went to [facility] and had UA (in epic) that indicated no ongoing UTI however was given macrobid and sent to pcp. PCP changed to cipro.  Denies any other symptoms including fever and or leakage.    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 Even with RT having been over a yr ago latent SE of RT are known to occur up to 5yrs or more after RT. Discussed that baseline treatment is to increased water intake, reduced caffeine and ETOH consumption. As well rx for urinary flow aids to reduced inflammation. Given already taking flowmax once a day will increase to BID.     Discussed agents for discomfort including pyridium. Will prescribe PRN. As patient is going out of town in coming days will plan to arrange for urology once returned and cysto to confirm exact cause of ongoing symptoms however high suspicion for RT cystitis. If dx will proceed with HBO if amendable.

## 2025-03-27 DIAGNOSIS — R39.12 WEAK URINARY STREAM: ICD-10-CM

## 2025-03-27 DIAGNOSIS — R30.0 DYSURIA: ICD-10-CM

## 2025-03-31 RX ORDER — TAMSULOSIN HYDROCHLORIDE 0.4 MG/1
0.4 CAPSULE ORAL 2 TIMES DAILY
Qty: 90 CAPSULE | Refills: 3 | Status: SHIPPED | OUTPATIENT
Start: 2025-03-31

## 2025-04-10 ENCOUNTER — APPOINTMENT (OUTPATIENT)
Dept: UROLOGY | Facility: CLINIC | Age: 75
End: 2025-04-10
Payer: MEDICARE

## 2025-04-10 DIAGNOSIS — C61 PROSTATE CANCER (MULTI): Primary | ICD-10-CM

## 2025-04-10 LAB
POC APPEARANCE, URINE: CLEAR
POC BILIRUBIN, URINE: NEGATIVE
POC BLOOD, URINE: NEGATIVE
POC COLOR, URINE: YELLOW
POC GLUCOSE, URINE: NEGATIVE MG/DL
POC KETONES, URINE: NEGATIVE MG/DL
POC LEUKOCYTES, URINE: NEGATIVE
POC NITRITE,URINE: NEGATIVE
POC PH, URINE: 6.5 PH
POC PROTEIN, URINE: NEGATIVE MG/DL
POC SPECIFIC GRAVITY, URINE: 1.01
POC UROBILINOGEN, URINE: 0.2 EU/DL

## 2025-04-10 PROCEDURE — 81003 URINALYSIS AUTO W/O SCOPE: CPT | Performed by: STUDENT IN AN ORGANIZED HEALTH CARE EDUCATION/TRAINING PROGRAM

## 2025-04-10 PROCEDURE — 99214 OFFICE O/P EST MOD 30 MIN: CPT | Performed by: STUDENT IN AN ORGANIZED HEALTH CARE EDUCATION/TRAINING PROGRAM

## 2025-04-10 PROCEDURE — G2211 COMPLEX E/M VISIT ADD ON: HCPCS | Performed by: STUDENT IN AN ORGANIZED HEALTH CARE EDUCATION/TRAINING PROGRAM

## 2025-04-10 NOTE — PROGRESS NOTES
"HPI:  Proc (2/14/23): TP prostate biopsy   Path: prostatic adenocarcinoma, LOLY #1 GG2 (Jaiden 3+4=7), 4/5 cores (25% of tissue), pattern 4, GG1 (Jaiden 3+3=6), 2/2 cores (35% of tissue)     Anthony Henry \"Romario" is a 74 y.o. male referred by Dr. Rico for elevated PSA. Hx of BPH. MRI prostate (12/29/21) showed diffuse non nodular hypointensities within the PZ, without evidence of focally restricted diffusion (PI-RADS 2). No evidence of pelvic lymphadenopathy. S/p TP prostate biopsy (2/14/23) with pathology showing prostatic adenocarcinoma, LOLY #1 GG2 (Jaiden 3+4=7), 4/5 cores (25% of tissue), pattern 4. Patient saw Dr. Iqbal, radiation oncology (3/13/23), and decided against radiation treatment at this time. MRI Prostate (12/14/23) showed Interval increase in size of a now PI-RADS 5 in the mid to apical left anterolateral TZ, there is broad abutment of the anterior capsule without evidence of extracapsular extension or enlarged pelvic lymph nodes. Reports dysuria, now resolved from Flomax twice daily. Reports difficulty emptying bladder, not bothersome. Reports urinary leakage. Denies hematuria.      Current smoker  SHx: bilateral knee replacements 22'  No family hx of prostate cancer     PSA: 0.34 (2/25/25), 6.14 (12/7/23), 6.60 (8/31/23), 6.78, fPSA 8% (10/17/22)  4K score (10/17/22): 63.5     MRI prostate (12/29/21): diffuse non nodular hypointensities within the PZ, without evidence of focally restricted diffusion (PI-RADS 2). No evidence of pelvic lymphadenopathy.     MRI Prostate (12/14/23): Interval increase in size of a now PI-RADS 5 in the mid to apical left anterolateral TZ, there is broad abutment of the anterior capsule without evidence of extracapsular extension or enlarged pelvic lymph nodes.    Review of Systems:  All systems reviewed. Anything negative noted in the HPI.    Physical Exam:  Vitals signs reviewed.  Constitutional:      Appearance: Well-developed.  HENT:     Head: Normocephalic and " "atraumatic.  Neck:     Musculoskeletal: Normal range of motion.  Pulmonary:     Effort: Pulmonary effort is normal.  Musculoskeletal: Normal range of motion.  Skin:     General: Skin is warm and dry.  Neurological:     Mental Status: Alert and oriented to person, place, and time.  Psychiatric:        Behavior: Behavior normal.        Thought Content: Thought content normal.        Judgment: Judgment normal.  Genitourinary:     Penis: Normal.      Scrotum/Testes: Normal.     Comments:  Abdominal:     Palpations: Abdomen is soft. There is no mass.     Tenderness: There is no tenderness. There is no guarding or rebound.     Hernia: No hernia is present.     Comments:     Assessment/Plan   Anthony Henry \"Romario" is a 74 y.o. male referred by Dr. Rico for elevated PSA. Hx of BPH. MRI prostate (12/29/21) showed diffuse non nodular hypointensities within the PZ, without evidence of focally restricted diffusion (PI-RADS 2). No evidence of pelvic lymphadenopathy. S/p TP prostate biopsy (2/14/23) with pathology showing prostatic adenocarcinoma, LOLY #1 GG2 (Jaiden 3+4=7), 4/5 cores (25% of tissue), pattern 4. Patient saw Dr. Iqbal, radiation oncology (3/13/23), and decided against radiation treatment at this time. MRI Prostate (12/14/23) showed Interval increase in size of a now PI-RADS 5 in the mid to apical left anterolateral TZ, there is broad abutment of the anterior capsule without evidence of extracapsular extension or enlarged pelvic lymph nodes. Reports dysuria, now resolved from Flomax twice daily. Reports difficulty emptying bladder, not bothersome. Reports urinary leakage. Denies hematuria. Management options including risks, benefits and alternatives discussed at length and all questions answered. Patient prefers to proceed with follow-up with radiation oncology. Will follow-up as needed.           Scribe Attestation  By signing my name below, IGisel Scribe   attest that this documentation has been " prepared under the direction and in the presence of Shmuel Alba MD.

## 2025-05-25 LAB
ALBUMIN SERPL-MCNC: 4.2 G/DL (ref 3.6–5.1)
ALP SERPL-CCNC: 76 U/L (ref 35–144)
ALT SERPL-CCNC: 16 U/L (ref 9–46)
ANION GAP SERPL CALCULATED.4IONS-SCNC: 7 MMOL/L (CALC) (ref 7–17)
AST SERPL-CCNC: 18 U/L (ref 10–35)
BILIRUB SERPL-MCNC: 0.8 MG/DL (ref 0.2–1.2)
BUN SERPL-MCNC: 15 MG/DL (ref 7–25)
CALCIUM SERPL-MCNC: 9.3 MG/DL (ref 8.6–10.3)
CHLORIDE SERPL-SCNC: 105 MMOL/L (ref 98–110)
CHOLEST SERPL-MCNC: 105 MG/DL
CHOLEST/HDLC SERPL: 2 (CALC)
CO2 SERPL-SCNC: 29 MMOL/L (ref 20–32)
CREAT SERPL-MCNC: 1.06 MG/DL (ref 0.7–1.28)
EGFRCR SERPLBLD CKD-EPI 2021: 74 ML/MIN/1.73M2
EST. AVERAGE GLUCOSE BLD GHB EST-MCNC: 126 MG/DL
EST. AVERAGE GLUCOSE BLD GHB EST-SCNC: 7 MMOL/L
GLUCOSE SERPL-MCNC: 107 MG/DL (ref 65–99)
HBA1C MFR BLD: 6 %
HDLC SERPL-MCNC: 52 MG/DL
LDLC SERPL CALC-MCNC: 39 MG/DL (CALC)
NONHDLC SERPL-MCNC: 53 MG/DL (CALC)
POTASSIUM SERPL-SCNC: 4 MMOL/L (ref 3.5–5.3)
PROT SERPL-MCNC: 6.5 G/DL (ref 6.1–8.1)
SODIUM SERPL-SCNC: 141 MMOL/L (ref 135–146)
TRIGL SERPL-MCNC: 63 MG/DL

## 2025-05-27 ENCOUNTER — APPOINTMENT (OUTPATIENT)
Dept: PRIMARY CARE | Facility: CLINIC | Age: 75
End: 2025-05-27
Payer: MEDICARE

## 2025-05-27 VITALS
SYSTOLIC BLOOD PRESSURE: 120 MMHG | BODY MASS INDEX: 28.98 KG/M2 | OXYGEN SATURATION: 97 % | HEIGHT: 71 IN | HEART RATE: 78 BPM | DIASTOLIC BLOOD PRESSURE: 78 MMHG | WEIGHT: 207 LBS

## 2025-05-27 DIAGNOSIS — N40.0 BPH WITHOUT OBSTRUCTION/LOWER URINARY TRACT SYMPTOMS: ICD-10-CM

## 2025-05-27 DIAGNOSIS — R49.0 HOARSENESS: ICD-10-CM

## 2025-05-27 DIAGNOSIS — R73.03 PRE-DIABETES: ICD-10-CM

## 2025-05-27 DIAGNOSIS — E78.9 BORDERLINE HIGH CHOLESTEROL: ICD-10-CM

## 2025-05-27 DIAGNOSIS — M17.0 OSTEOARTHRITIS OF BOTH KNEES, UNSPECIFIED OSTEOARTHRITIS TYPE: ICD-10-CM

## 2025-05-27 DIAGNOSIS — Z00.00 MEDICARE ANNUAL WELLNESS VISIT, SUBSEQUENT: Primary | ICD-10-CM

## 2025-05-27 DIAGNOSIS — R73.01 ABNORMAL FASTING GLUCOSE: ICD-10-CM

## 2025-05-27 DIAGNOSIS — I10 PRIMARY HYPERTENSION: ICD-10-CM

## 2025-05-27 DIAGNOSIS — M54.50 ACUTE BILATERAL LOW BACK PAIN WITHOUT SCIATICA: ICD-10-CM

## 2025-05-27 PROCEDURE — 3074F SYST BP LT 130 MM HG: CPT | Performed by: FAMILY MEDICINE

## 2025-05-27 PROCEDURE — 1170F FXNL STATUS ASSESSED: CPT | Performed by: FAMILY MEDICINE

## 2025-05-27 PROCEDURE — 1036F TOBACCO NON-USER: CPT | Performed by: FAMILY MEDICINE

## 2025-05-27 PROCEDURE — G0444 DEPRESSION SCREEN ANNUAL: HCPCS | Performed by: FAMILY MEDICINE

## 2025-05-27 PROCEDURE — 99214 OFFICE O/P EST MOD 30 MIN: CPT | Performed by: FAMILY MEDICINE

## 2025-05-27 PROCEDURE — 3008F BODY MASS INDEX DOCD: CPT | Performed by: FAMILY MEDICINE

## 2025-05-27 PROCEDURE — 3078F DIAST BP <80 MM HG: CPT | Performed by: FAMILY MEDICINE

## 2025-05-27 PROCEDURE — 1159F MED LIST DOCD IN RCRD: CPT | Performed by: FAMILY MEDICINE

## 2025-05-27 PROCEDURE — G0439 PPPS, SUBSEQ VISIT: HCPCS | Performed by: FAMILY MEDICINE

## 2025-05-27 PROCEDURE — 1160F RVW MEDS BY RX/DR IN RCRD: CPT | Performed by: FAMILY MEDICINE

## 2025-05-27 RX ORDER — CELECOXIB 200 MG/1
200 CAPSULE ORAL DAILY
Qty: 90 CAPSULE | Refills: 1 | Status: SHIPPED | OUTPATIENT
Start: 2025-05-27

## 2025-05-27 RX ORDER — POTASSIUM CHLORIDE 750 MG/1
10 TABLET, FILM COATED, EXTENDED RELEASE ORAL
Qty: 90 TABLET | Refills: 3 | Status: SHIPPED | OUTPATIENT
Start: 2025-05-27

## 2025-05-27 RX ORDER — HYDRALAZINE HYDROCHLORIDE 50 MG/1
50 TABLET, FILM COATED ORAL 2 TIMES DAILY
Qty: 180 TABLET | Refills: 1 | Status: SHIPPED | OUTPATIENT
Start: 2025-05-27

## 2025-05-27 RX ORDER — ATORVASTATIN CALCIUM 10 MG/1
10 TABLET, FILM COATED ORAL DAILY
Qty: 90 TABLET | Refills: 3 | Status: SHIPPED | OUTPATIENT
Start: 2025-05-27

## 2025-05-27 RX ORDER — TRIAMTERENE AND HYDROCHLOROTHIAZIDE 75; 50 MG/1; MG/1
1 TABLET ORAL
Qty: 90 TABLET | Refills: 1 | Status: SHIPPED | OUTPATIENT
Start: 2025-05-27

## 2025-05-27 RX ORDER — TERAZOSIN 2 MG/1
2 CAPSULE ORAL NIGHTLY
Qty: 90 CAPSULE | Refills: 3 | Status: SHIPPED | OUTPATIENT
Start: 2025-05-27

## 2025-05-27 RX ORDER — CLONIDINE 0.3 MG/24H
PATCH, EXTENDED RELEASE TRANSDERMAL
Qty: 12 PATCH | Refills: 1 | Status: SHIPPED | OUTPATIENT
Start: 2025-05-27

## 2025-05-27 RX ORDER — METFORMIN HYDROCHLORIDE 500 MG/1
500 TABLET ORAL
Qty: 90 TABLET | Refills: 1 | Status: SHIPPED | OUTPATIENT
Start: 2025-05-27

## 2025-05-27 ASSESSMENT — ANXIETY QUESTIONNAIRES
IF YOU CHECKED OFF ANY PROBLEMS ON THIS QUESTIONNAIRE, HOW DIFFICULT HAVE THESE PROBLEMS MADE IT FOR YOU TO DO YOUR WORK, TAKE CARE OF THINGS AT HOME, OR GET ALONG WITH OTHER PEOPLE: NOT DIFFICULT AT ALL
7. FEELING AFRAID AS IF SOMETHING AWFUL MIGHT HAPPEN: NOT AT ALL
GAD7 TOTAL SCORE: 0
1. FEELING NERVOUS, ANXIOUS, OR ON EDGE: NOT AT ALL
3. WORRYING TOO MUCH ABOUT DIFFERENT THINGS: NOT AT ALL
2. NOT BEING ABLE TO STOP OR CONTROL WORRYING: NOT AT ALL
6. BECOMING EASILY ANNOYED OR IRRITABLE: NOT AT ALL
5. BEING SO RESTLESS THAT IT IS HARD TO SIT STILL: NOT AT ALL
4. TROUBLE RELAXING: NOT AT ALL

## 2025-05-27 ASSESSMENT — PATIENT HEALTH QUESTIONNAIRE - PHQ9
SUM OF ALL RESPONSES TO PHQ QUESTIONS 1-9: 0
4. FEELING TIRED OR HAVING LITTLE ENERGY: NOT AT ALL
6. FEELING BAD ABOUT YOURSELF - OR THAT YOU ARE A FAILURE OR HAVE LET YOURSELF OR YOUR FAMILY DOWN: NOT AT ALL
10. IF YOU CHECKED OFF ANY PROBLEMS, HOW DIFFICULT HAVE THESE PROBLEMS MADE IT FOR YOU TO DO YOUR WORK, TAKE CARE OF THINGS AT HOME, OR GET ALONG WITH OTHER PEOPLE: NOT DIFFICULT AT ALL
1. LITTLE INTEREST OR PLEASURE IN DOING THINGS: NOT AT ALL
9. THOUGHTS THAT YOU WOULD BE BETTER OFF DEAD, OR OF HURTING YOURSELF: NOT AT ALL
2. FEELING DOWN, DEPRESSED OR HOPELESS: NOT AT ALL
7. TROUBLE CONCENTRATING ON THINGS, SUCH AS READING THE NEWSPAPER OR WATCHING TELEVISION: NOT AT ALL
8. MOVING OR SPEAKING SO SLOWLY THAT OTHER PEOPLE COULD HAVE NOTICED. OR THE OPPOSITE, BEING SO FIGETY OR RESTLESS THAT YOU HAVE BEEN MOVING AROUND A LOT MORE THAN USUAL: NOT AT ALL
SUM OF ALL RESPONSES TO PHQ9 QUESTIONS 1 AND 2: 0
5. POOR APPETITE OR OVEREATING: NOT AT ALL
3. TROUBLE FALLING OR STAYING ASLEEP OR SLEEPING TOO MUCH: NOT AT ALL

## 2025-05-27 ASSESSMENT — ENCOUNTER SYMPTOMS
OCCASIONAL FEELINGS OF UNSTEADINESS: 0
LOSS OF SENSATION IN FEET: 0
DEPRESSION: 0

## 2025-05-27 ASSESSMENT — ACTIVITIES OF DAILY LIVING (ADL)
TAKING_MEDICATION: INDEPENDENT
DRESSING: INDEPENDENT
GROCERY_SHOPPING: INDEPENDENT
MANAGING_FINANCES: INDEPENDENT
DOING_HOUSEWORK: INDEPENDENT
BATHING: INDEPENDENT

## 2025-05-27 NOTE — PROGRESS NOTES
Subjective   Reason for Visit: Anthony Henry is an 74 y.o. male here for a Medicare Wellness visit.     Past Medical, Surgical, and Family History reviewed and updated in chart.    Reviewed all medications by prescribing practitioner or clinical pharmacist (such as prescriptions, OTCs, herbal therapies and supplements) and documented in the medical record.    HPI    Followed by urology, Dr. Douglas as well as oncology - Dr. Farida Barr with bladder inflammation - Flomax twice daily currently    Dermatology - Melissa Melara = had a few lesions frozen off last week  Watching a lesion on his chest for now    History of Present Illness  The patient is a 74-year-old gentleman who presents for a Medicare wellness visit and follow-up on medication refills.    Hoarseness and Dysphagia  He reports an increase in hoarseness during the evenings, which he attributes to nodules on the side of his neck. He has not undergone any procedures for these nodules. He has not experienced any exacerbation of heartburn or reflux symptoms. He also reports normal sinus function, although with some worsening due to weather changes. He has been experiencing dysphagia, particularly with liquids, but has not had any episodes of vomiting. He has not undergone a swallow study or endoscopy. He has a scheduled appointment with Dr. Soni in the fall. He uses Flonase and Afrin twice daily, which he finds beneficial. He does not take Allegra or any other antihistamines.  - Onset: Hoarseness increases during the evenings; dysphagia has been ongoing.  - Location: Nodules on the side of the neck; dysphagia particularly with liquids.  - Character: Hoarseness and difficulty swallowing liquids.  - Alleviating Factors: Flonase and Afrin twice daily are beneficial.  - Timing: Hoarseness worsens in the evenings.    Back Pain  He has been experiencing back pain, which he attributes to sciatica, and is currently in the process of moving. He reports no issues with  "his stomach or bowel movements and reports satisfactory sleep quality. He reports manageable stress levels and no unusual headaches or chest pain.  - Onset: Ongoing back pain attributed to sciatica.  - Location: Back.  - Character: Pain attributed to sciatica.  - Aggravating Factors: Currently in the process of moving.    Occasional Swelling in Left Ankle  He reports occasional swelling in his left ankle but does not experience excessive thirst or frequent urination. He also reports no recent changes in his vision.  - Onset: Occasional swelling.  - Location: Left ankle.  - Character: Swelling without excessive thirst or frequent urination.    Bladder Abrasion Due to Radiation Therapy  He had a pinched nerve in his neck and left shoulder area in 01/2025, which was managed with therapy and chiropractic care. In 02/2025, he contracted COVID-19 and was treated with Paxlovid. In 03/2025, he was diagnosed with bladder abrasion due to radiation therapy for prostate cancer last year, which led to bladder irritation. His Flomax dosage was increased to twice a day.  - Onset: Diagnosed with bladder abrasion in 03/2025.  - Location: Bladder.  - Character: Bladder irritation due to radiation therapy for prostate cancer.  - Timing: Radiation therapy for prostate cancer last year; Flomax dosage increased to twice a day.    FAMILY HISTORY  - No family history of diabetes      Patient Care Team:  Lindsay Bone DO as PCP - General (Family Medicine)  Lindsay Bone DO as PCP - [employer] PCP  Elissa Iqbal MD PhD as Radiation Oncologist (Radiation Oncology)  Shmuel Alba MD as Surgeon (Urology)  Melissa Siddiqui DO as Consulting Physician (Dermatology)  LUCHO Iraheta-CNP as Consulting Physician (Radiation Oncology)     Review of Systems    Objective   Vitals:  /78 (BP Location: Left arm, Patient Position: Sitting, BP Cuff Size: Adult)   Pulse 78   Ht 1.803 m (5' 11\")   Wt 93.9 kg (207 lb)   SpO2 " 97%   BMI 28.87 kg/m²       Physical Exam  Constitutional:       General: He is not in acute distress.     Appearance: Normal appearance. He is not ill-appearing.   HENT:      Head: Normocephalic and atraumatic.   Neck:      Vascular: No carotid bruit.   Cardiovascular:      Rate and Rhythm: Normal rate and regular rhythm.      Pulses: Normal pulses.      Heart sounds: Normal heart sounds. No murmur heard.     No gallop.   Pulmonary:      Effort: Pulmonary effort is normal.      Breath sounds: Normal breath sounds. No wheezing, rhonchi or rales.   Musculoskeletal:      Cervical back: Normal range of motion and neck supple. No rigidity or tenderness.   Lymphadenopathy:      Cervical: No cervical adenopathy.   Skin:     General: Skin is warm and dry.   Neurological:      Mental Status: He is alert.   Psychiatric:         Mood and Affect: Mood normal.         Behavior: Behavior normal.       Assessment & Plan  Medicare annual wellness visit, subsequent  Reviewed recent fasting labs.  Orders:    1 Year Follow Up In [facility] - PCP - Wellness Exam; Future    Hoarseness  Hoarseness: Stable.  - Fluid behind the eardrums noted, likely due to congestion and inadequate tube popping, potentially exacerbated by allergies.  - Possible causes include silent reflux or postnasal drip.  - Consider addition of Allegra, Claritin, or Zyrtec to the regimen.  - Referral to Dr. Soni for further evaluation of vocal cord nodules.  Orders:    Referral to ENT; Future    Primary hypertension  Controlled   Refilling at current dosage.  Follow-up  6 MONTHS   Orders:    cloNIDine (Catapres-TTS) 0.3 mg/24 hr patch; Apply one patach on the skin and replace every 7 days, as directed    hydrALAZINE (Apresoline) 50 mg tablet; Take 1 tablet (50 mg) by mouth 2 times a day.    potassium chloride CR 10 mEq ER tablet; Take 1 tablet (10 mEq) by mouth once daily. Take a half a tablet (5 meq)    triamterene-hydrochlorothiazid (Maxzide) 75-50 mg  tablet; Take 1 tablet by mouth once daily.    Borderline high cholesterol  Controlled on current regimen.  Refilling medications at current dosage.  Follow-up  6 MONTHS   Orders:    atorvastatin (Lipitor) 10 mg tablet; Take 1 tablet (10 mg) by mouth once daily.    Osteoarthritis of both knees, unspecified osteoarthritis type    Orders:    celecoxib (CeleBREX) 200 mg capsule; Take 1 capsule (200 mg) by mouth once daily.    Abnormal fasting glucose  sss  Orders:    metFORMIN (Glucophage) 500 mg tablet; Take 1 tablet (500 mg) by mouth once daily with breakfast.    BPH without obstruction/lower urinary tract symptoms  Stable on current Hytrin and  Orders:    terazosin (Hytrin) 2 mg capsule; Take 1 capsule (2 mg) by mouth once daily at bedtime.    Pre-diabetes  Stable.  - Fasting blood glucose level 107 and hemoglobin A1c 6.0, slightly increased from 5.9 six months ago.  - Continue metformin once daily after dinner.  - If blood glucose levels increase, consider adding a second dose of metformin with breakfast.       Acute bilateral low back pain without sciatica  Sciatica: Stable.  - Back pain likely due to moving and downsizing activities.  - No new treatment required.           Assessment & Plan   Medication Management.  - Refills for atorvastatin, terazosin, Flomax, Celebrex, hydralazine, potassium, and water pill provided and sent to pharmacy.  - Advised to take atorvastatin at bedtime, terazosin at night, Flomax with breakfast and dinner, Celebrex in the morning with breakfast, hydralazine twice daily, potassium once daily (either in the morning or with dinner), and the water pill earlier in the day.    Follow-up  - Follow up in 6 months.         Depression Screening  5 - 10 minutes were spent screening for depression.       Lindsay Bone DO   This medical note was created with the assistance of artificial intelligence (AI) for documentation purposes. The content has been reviewed and confirmed by the [facility]  provider for accuracy and completeness. Patient consented to the use of audio recording and use of AI during their visit.

## 2025-05-27 NOTE — ASSESSMENT & PLAN NOTE
Reviewed recent fasting labs.  Orders:    1 Year Follow Up In [facility] - PCP - Wellness Exam; Future

## 2025-05-27 NOTE — ASSESSMENT & PLAN NOTE
Controlled   Refilling at current dosage.  Follow-up  6 MONTHS   Orders:    cloNIDine (Catapres-TTS) 0.3 mg/24 hr patch; Apply one patach on the skin and replace every 7 days, as directed    hydrALAZINE (Apresoline) 50 mg tablet; Take 1 tablet (50 mg) by mouth 2 times a day.    potassium chloride CR 10 mEq ER tablet; Take 1 tablet (10 mEq) by mouth once daily. Take a half a tablet (5 meq)    triamterene-hydrochlorothiazid (Maxzide) 75-50 mg tablet; Take 1 tablet by mouth once daily.

## 2025-05-27 NOTE — ASSESSMENT & PLAN NOTE
Stable on current Hytrin and  Orders:    terazosin (Hytrin) 2 mg capsule; Take 1 capsule (2 mg) by mouth once daily at bedtime.

## 2025-05-28 NOTE — ASSESSMENT & PLAN NOTE
Stable.  - Fasting blood glucose level 107 and hemoglobin A1c 6.0, slightly increased from 5.9 six months ago.  - Continue metformin once daily after dinner.  - If blood glucose levels increase, consider adding a second dose of metformin with breakfast.

## (undated) DEVICE — SYSTEM, SPACEOAR VUE, HYDROGEL

## (undated) DEVICE — Device

## (undated) DEVICE — APPLICATOR, CHLORAPREP, W/ORANGE TINT, 26ML

## (undated) DEVICE — GLOVE, SURGICAL, PROTEXIS PI , 7.5, PF, LF